# Patient Record
Sex: FEMALE | Race: WHITE | NOT HISPANIC OR LATINO | URBAN - METROPOLITAN AREA
[De-identification: names, ages, dates, MRNs, and addresses within clinical notes are randomized per-mention and may not be internally consistent; named-entity substitution may affect disease eponyms.]

---
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
## 2022-12-02 ENCOUNTER — OFFICE VISIT (OUTPATIENT)
Dept: OBSTETRICS AND GYNECOLOGY | Age: 22
End: 2022-12-02

## 2022-12-02 VITALS
DIASTOLIC BLOOD PRESSURE: 76 MMHG | SYSTOLIC BLOOD PRESSURE: 118 MMHG | HEIGHT: 64 IN | WEIGHT: 168 LBS | BODY MASS INDEX: 28.68 KG/M2

## 2022-12-02 DIAGNOSIS — Z11.3 ROUTINE SCREENING FOR STI (SEXUALLY TRANSMITTED INFECTION): ICD-10-CM

## 2022-12-02 DIAGNOSIS — O36.80X1 ENCOUNTER TO DETERMINE FETAL VIABILITY OF PREGNANCY, FETUS 1: ICD-10-CM

## 2022-12-02 DIAGNOSIS — Z3A.08 8 WEEKS GESTATION OF PREGNANCY: Primary | ICD-10-CM

## 2022-12-02 DIAGNOSIS — Z13.89 SCREENING FOR HEMATURIA OR PROTEINURIA: ICD-10-CM

## 2022-12-02 LAB
GLUCOSE UR STRIP-MCNC: NEGATIVE MG/DL
PROT UR STRIP-MCNC: NEGATIVE MG/DL

## 2022-12-02 PROCEDURE — 76817 TRANSVAGINAL US OBSTETRIC: CPT | Performed by: OBSTETRICS & GYNECOLOGY

## 2022-12-02 PROCEDURE — 99204 OFFICE O/P NEW MOD 45 MIN: CPT | Performed by: OBSTETRICS & GYNECOLOGY

## 2022-12-02 NOTE — PROGRESS NOTES
"GYN Visit    2022    Patient: Gela Valladares          MR#:[id number]      Chief Complaint   Patient presents with   • Follow-up     U/S @ 9:30, Pregnancy confirmation, LMP 10/3/22, No concerns at this time       History of Present Illness    22 y.o. female  who presents for  Pregnancy confirmation  Regular menses prior to preg  No nausea  Taking PNV  BF Aaron Billings  Lives with BF, not working or going to school currently  ?HPV series  Declines flu vac  Did not get covid vac            Patient's last menstrual period was 10/03/2022 (exact date).    ________________________________________  There is no problem list on file for this patient.      Past Medical History:   Diagnosis Date   • ADHD    • Anemia        History reviewed. No pertinent surgical history.    Social History     Tobacco Use   Smoking Status Never   Smokeless Tobacco Never       has a current medication list which includes the following prescription(s): ferrous sulfate and prenatal low iron.  ________________________________________    Current contraception: none      The following portions of the patient's history were reviewed and updated as appropriate: allergies, current medications, past family history, past medical history, past social history, past surgical history and problem list.    Review of Systems    Pertinent items are noted in HPI.     Objective   Physical Exam    /76   Ht 162.6 cm (64\")   Wt 76.2 kg (168 lb)   LMP 10/03/2022 (Exact Date)   Breastfeeding No   BMI 28.84 kg/m²    BP Readings from Last 3 Encounters:   22 118/76   22 120/77   22 124/81      Wt Readings from Last 3 Encounters:   22 76.2 kg (168 lb)   22 77.6 kg (171 lb) (92 %, Z= 1.40)*   22 79.8 kg (176 lb) (94 %, Z= 1.51)*     * Growth percentiles are based on CDC (Girls, 2-20 Years) data.      BMI: Estimated body mass index is 28.84 kg/m² as calculated from the following:    Height as of this encounter: 162.6 cm " "(64\").    Weight as of this encounter: 76.2 kg (168 lb).    Lungs: non labored breathing, no wheezing or tachpnea  Extremities: extremities normal, atraumatic, no cyanosis or edema  Skin: Skin color, texture, turgor normal. No rashes or lesions  Neurologic: Grossly normal  General:   alert, appears stated age and cooperative   Abdomen: soft, non-tender, without masses or organomegaly            See US- viable IUP at 8 weeks                 Assessment:      Diagnoses and all orders for this visit:    1. 8 weeks gestation of pregnancy (Primary)  -     OB Panel With HIV  -     Urine Culture - Urine, Urine, Clean Catch  -     Chlamydia / Gonococcus / Mycoplasma Genitalium, AKILAH, Urine - Urine, Clean Catch  -     POC Urinalysis Dipstick    2. Encounter to determine fetal viability of pregnancy, fetus 1  -     US Ob Transvaginal    3. Screening for hematuria or proteinuria  -     Urine Culture - Urine, Urine, Clean Catch  -     POC Urinalysis Dipstick    4. Routine screening for STI (sexually transmitted infection)  -     Chlamydia / Gonococcus / Mycoplasma Genitalium, AKILAH, Urine - Urine, Clean Catch              "

## 2022-12-03 LAB
ABO GROUP BLD: ABNORMAL
BASOPHILS # BLD AUTO: 0 X10E3/UL (ref 0–0.2)
BASOPHILS NFR BLD AUTO: 0 %
BLD GP AB SCN SERPL QL: NEGATIVE
EOSINOPHIL # BLD AUTO: 0.1 X10E3/UL (ref 0–0.4)
EOSINOPHIL NFR BLD AUTO: 1 %
ERYTHROCYTE [DISTWIDTH] IN BLOOD BY AUTOMATED COUNT: 12.9 % (ref 11.7–15.4)
HBV SURFACE AG SERPL QL IA: NEGATIVE
HCT VFR BLD AUTO: 40 % (ref 34–46.6)
HCV AB S/CO SERPL IA: <0.1 S/CO RATIO (ref 0–0.9)
HGB BLD-MCNC: 13.4 G/DL (ref 11.1–15.9)
HIV 1+2 AB+HIV1 P24 AG SERPL QL IA: NON REACTIVE
IMM GRANULOCYTES # BLD AUTO: 0 X10E3/UL (ref 0–0.1)
IMM GRANULOCYTES NFR BLD AUTO: 0 %
LYMPHOCYTES # BLD AUTO: 2 X10E3/UL (ref 0.7–3.1)
LYMPHOCYTES NFR BLD AUTO: 29 %
MCH RBC QN AUTO: 28.6 PG (ref 26.6–33)
MCHC RBC AUTO-ENTMCNC: 33.5 G/DL (ref 31.5–35.7)
MCV RBC AUTO: 86 FL (ref 79–97)
MONOCYTES # BLD AUTO: 0.4 X10E3/UL (ref 0.1–0.9)
MONOCYTES NFR BLD AUTO: 6 %
NEUTROPHILS # BLD AUTO: 4.5 X10E3/UL (ref 1.4–7)
NEUTROPHILS NFR BLD AUTO: 64 %
PLATELET # BLD AUTO: 240 X10E3/UL (ref 150–450)
RBC # BLD AUTO: 4.68 X10E6/UL (ref 3.77–5.28)
RH BLD: POSITIVE
RPR SER QL: NON REACTIVE
RUBV IGG SERPL IA-ACNC: <0.9 INDEX
WBC # BLD AUTO: 6.9 X10E3/UL (ref 3.4–10.8)

## 2022-12-08 LAB
BACTERIA UR CULT: NORMAL
BACTERIA UR CULT: NORMAL
C TRACH RRNA UR QL NAA+PROBE: NEGATIVE
M GENITALIUM DNA UR QL NAA+PROBE: NEGATIVE
N GONORRHOEA RRNA UR QL NAA+PROBE: NEGATIVE

## 2022-12-28 ENCOUNTER — TELEPHONE (OUTPATIENT)
Dept: OBSTETRICS AND GYNECOLOGY | Age: 22
End: 2022-12-28

## 2022-12-28 RX ORDER — PNV NO.95/FERROUS FUM/FOLIC AC 28MG-0.8MG
1 TABLET ORAL DAILY
Qty: 30 TABLET | Refills: 6 | Status: SHIPPED | OUTPATIENT
Start: 2022-12-28 | End: 2023-01-31 | Stop reason: SDUPTHER

## 2022-12-28 NOTE — TELEPHONE ENCOUNTER
Caller: Gela Valladares    Relationship: Self    Best call back number: [phone]    Requested Prescriptions:  PRENATALS     Pharmacy where request should be sent:  [phone]    Additional details provided by patient: PT ASKED IF DR. MONGE COULD PRESCRIBE HER PRENATALS AND SEND TO [facility].     Would you like a call back once the refill request has been completed: [x] Yes [] No    If the office needs to give you a call back, can they leave a voicemail: [x] Yes [] No    Lisa Fernandes Rep   12/28/22 08:21 EST

## 2023-01-03 ENCOUNTER — ROUTINE PRENATAL (OUTPATIENT)
Dept: OBSTETRICS AND GYNECOLOGY | Age: 23
End: 2023-01-03
Payer: COMMERCIAL

## 2023-01-03 VITALS — BODY MASS INDEX: 28.31 KG/M2 | SYSTOLIC BLOOD PRESSURE: 118 MMHG | WEIGHT: 164.9 LBS | DIASTOLIC BLOOD PRESSURE: 74 MMHG

## 2023-01-03 DIAGNOSIS — Z3A.13 13 WEEKS GESTATION OF PREGNANCY: Primary | ICD-10-CM

## 2023-01-03 DIAGNOSIS — Z34.81 PRENATAL CARE, SUBSEQUENT PREGNANCY IN FIRST TRIMESTER: ICD-10-CM

## 2023-01-03 DIAGNOSIS — Z13.89 SCREENING FOR HEMATURIA OR PROTEINURIA: ICD-10-CM

## 2023-01-03 DIAGNOSIS — Z31.430 ENCOUNTER OF FEMALE FOR TESTING FOR GENETIC DISEASE CARRIER STATUS FOR PROCREATIVE MANAGEMENT: ICD-10-CM

## 2023-01-03 LAB
GLUCOSE UR STRIP-MCNC: NEGATIVE MG/DL
PROT UR STRIP-MCNC: NEGATIVE MG/DL

## 2023-01-03 PROCEDURE — 99213 OFFICE O/P EST LOW 20 MIN: CPT | Performed by: OBSTETRICS & GYNECOLOGY

## 2023-01-03 NOTE — PROGRESS NOTES
Pt feels well , no issues  Panorama and horizon today  Follow up 4 weeks, declines flu vac  AFP at fu   Chief Complaint   Patient presents with   • Routine Prenatal Visit     13w1d, Gene Testing today, Declined Flu shot, No concerns at this time       HPI:  Pt presents for routine prenatal visit    ROS:  No fever or chills, no nausea or vomiting, no contractions, no leg pain, no LE edema, no leaking fluid, no bleeding, no headache, no dysuria  All other systems reviewed and negative    Exam:  See flow sheet  General:  Alert and oriented and no distress  Neck: no lymphadenopathy or thyromegaly  Lungs: non - labored breathing  Abd:  See flow sheet, fundus nontender  Ext: see flow sheet, non-tender bilateral , no lesions  Neuro: grossly normal    Assessment:/ PLAN:    Diagnoses and all orders for this visit:    1. 13 weeks gestation of pregnancy (Primary)  -     POC Urinalysis Dipstick  -     Rosita Panorama Prenatal Test: Chromosomes 13, 18, 21, X & Y: Triploidy 22Q.11.2 Deletion - Blood,  -     Rosita Horizon 14 (Pan-Ethnic Standard) - Blood,    2. Screening for hematuria or proteinuria  -     POC Urinalysis Dipstick    3. Prenatal care, subsequent pregnancy in first trimester  -     Rosita Panorama Prenatal Test: Chromosomes 13, 18, 21, X & Y: Triploidy 22Q.11.2 Deletion - Blood,  -     Rosita Horizon 14 (Pan-Ethnic Standard) - Blood,    4. Encounter of female for testing for genetic disease carrier status for procreative management  -     Rosita Panorama Prenatal Test: Chromosomes 13, 18, 21, X & Y: Triploidy 22Q.11.2 Deletion - Blood,  -     Rosita Horizon 14 (Pan-Ethnic Standard) - Blood,

## 2023-01-09 LAB
Lab: NORMAL
NTRA FETAL FRACTION: NORMAL
NTRA GENDER OF FETUS: NORMAL
NTRA MONOSOMY X AGE-BASED RISK TEXT: NORMAL
NTRA MONOSOMY X RESULT TEXT: NORMAL
NTRA MONOSOMY X RISK SCORE TEXT: NORMAL
NTRA TRIPLOIDY RESULT TEXT: NORMAL
NTRA TRISOMY 13 AGE-BASED RISK TEXT: NORMAL
NTRA TRISOMY 13 RESULT TEXT: NORMAL
NTRA TRISOMY 13 RISK SCORE TEXT: NORMAL
NTRA TRISOMY 18 AGE-BASED RISK TEXT: NORMAL
NTRA TRISOMY 18 RESULT TEXT: NORMAL
NTRA TRISOMY 18 RISK SCORE TEXT: NORMAL
NTRA TRISOMY 21 AGE-BASED RISK TEXT: NORMAL
NTRA TRISOMY 21 RESULT TEXT: NORMAL
NTRA TRISOMY 21 RISK SCORE TEXT: NORMAL

## 2023-01-10 LAB
Lab: NEGATIVE
Lab: NORMAL
NTRA ALPHA-THALASSEMIA: NEGATIVE
NTRA BETA-HEMOGLOBINOPATHIES: NEGATIVE
NTRA CANAVAN DISEASE: NEGATIVE
NTRA CYSTIC FIBROSIS: NEGATIVE
NTRA DUCHENNE/BECKER MUSCULAR DYSTROPHY: NEGATIVE
NTRA FAMILIAL DYSAUTONOMIA: NEGATIVE
NTRA FRAGILE X SYNDROME: NEGATIVE
NTRA GALACTOSEMIA: NEGATIVE
NTRA GAUCHER DISEASE: NEGATIVE
NTRA MEDIUM CHAIN ACYL-COA DEHYDROGENASE DEFICIENCY: NEGATIVE
NTRA POLYCYSTIC KIDNEY DISEASE, AUTOSOMAL RECESSIVE: NEGATIVE
NTRA SMITH-LEMLI-OPITZ SYNDROME: NEGATIVE
NTRA SPINAL MUSCULAR ATROPHY: NEGATIVE
NTRA TAY-SACHS DISEASE: NEGATIVE

## 2023-01-31 RX ORDER — PNV NO.95/FERROUS FUM/FOLIC AC 28MG-0.8MG
1 TABLET ORAL DAILY
Qty: 30 TABLET | Refills: 6 | Status: SHIPPED | OUTPATIENT
Start: 2023-01-31

## 2023-01-31 NOTE — TELEPHONE ENCOUNTER
Caller: Gela Valladares JERICHO    Relationship: Self    Best call back number: [phone]    Requested Prescriptions:   Requested Prescriptions     Pending Prescriptions Disp Refills   • Prenatal Vit-Fe Fumarate-FA (Prenatal Vitamin) 27-0.8 MG tablet 30 tablet 6     Sig: Take 1 tablet by mouth Daily.        Pharmacy where request should be sent: [id number]    Does the patient have less than a 3 day supply:  [x] Yes  [] No    Would you like a call back once the refill request has been completed: [x] Yes [] No    If the office needs to give you a call back, can they leave a voicemail: [x] Yes [] No    Lisa Fernandes Rep   01/31/23 13:54 EST

## 2023-02-02 ENCOUNTER — ROUTINE PRENATAL (OUTPATIENT)
Dept: OBSTETRICS AND GYNECOLOGY | Age: 23
End: 2023-02-02
Payer: COMMERCIAL

## 2023-02-02 VITALS — SYSTOLIC BLOOD PRESSURE: 116 MMHG | WEIGHT: 166 LBS | BODY MASS INDEX: 28.49 KG/M2 | DIASTOLIC BLOOD PRESSURE: 70 MMHG

## 2023-02-02 DIAGNOSIS — Z3A.17 17 WEEKS GESTATION OF PREGNANCY: ICD-10-CM

## 2023-02-02 DIAGNOSIS — Z34.80 SUPERVISION OF OTHER NORMAL PREGNANCY, ANTEPARTUM: ICD-10-CM

## 2023-02-02 DIAGNOSIS — Z13.89 SCREENING FOR BLOOD OR PROTEIN IN URINE: Primary | ICD-10-CM

## 2023-02-02 DIAGNOSIS — Z13.79 ENCOUNTER FOR GENETIC SCREENING FOR BIRTH DEFECT: ICD-10-CM

## 2023-02-02 LAB
GLUCOSE UR STRIP-MCNC: NEGATIVE MG/DL
PROT UR STRIP-MCNC: NEGATIVE MG/DL

## 2023-02-02 PROCEDURE — 99213 OFFICE O/P EST LOW 20 MIN: CPT | Performed by: OBSTETRICS & GYNECOLOGY

## 2023-02-02 NOTE — PROGRESS NOTES
Pt feels well, no issues  Declines flu vac again  AFP today  Anatomy at fu in 4 weeks  Its a girl!  Chief Complaint   Patient presents with   • Routine Prenatal Visit     Ob ck        HPI:  Pt presents for routine prenatal visit    ROS:  No fever or chills, no nausea or vomiting, no contractions, no leg pain, no LE edema, no leaking fluid, no bleeding, no headache, no dysuria  All other systems reviewed and negative    Exam:  See flow sheet  General:  Alert and oriented and no distress  Neck: no lymphadenopathy or thyromegaly  Lungs: non - labored breathing  Abd:  See flow sheet, fundus nontender  Ext: see flow sheet, non-tender bilateral , no lesions  Neuro: grossly normal    Assessment:/ PLAN:    Diagnoses and all orders for this visit:    1. Screening for blood or protein in urine (Primary)  -     POC Urinalysis Dipstick    2. 17 weeks gestation of pregnancy    3. Encounter for genetic screening for birth defect  -     Alpha Fetoprotein, Maternal    4. Supervision of other normal pregnancy, antepartum

## 2023-02-04 LAB
AFP INTERP SERPL-IMP: NORMAL
AFP INTERP SERPL-IMP: NORMAL
AFP MOM SERPL: 1.27
AFP SERPL-MCNC: 51.3 NG/ML
AGE AT DELIVERY: 20.8 YR
GA METHOD: NORMAL
GA: 17.6 WEEKS
IDDM PATIENT QL: NO
LABORATORY COMMENT REPORT: NORMAL
MULTIPLE PREGNANCY: NO
NEURAL TUBE DEFECT RISK FETUS: 5251 %
RESULT: NORMAL

## 2023-03-03 ENCOUNTER — TELEPHONE (OUTPATIENT)
Dept: OBSTETRICS AND GYNECOLOGY | Facility: CLINIC | Age: 23
End: 2023-03-03
Payer: COMMERCIAL

## 2023-03-03 ENCOUNTER — TELEPHONE (OUTPATIENT)
Dept: OBSTETRICS AND GYNECOLOGY | Age: 23
End: 2023-03-03

## 2023-03-03 NOTE — TELEPHONE ENCOUNTER
Caller: Gela Valladares    Relationship to patient: Self    Best call back number: [phone]    Type of visit: U/S AND OB FOLLOW UP     If rescheduling, when is the original appointment: 3/3/23    Additional notes:PT NEEDS TO R/S TODAYS U/S AND OB FOLLOW UP APPT DUE TO WEATHER PLEASE CALL PT TO R/S.

## 2023-03-03 NOTE — TELEPHONE ENCOUNTER
Caller: Gela Valladares    Relationship: Self    Best call back number: [phone]    Who is your current provider: DR MONGE @ [facility]    Who would you like your new provider to be: DR KRUSE    What are your reasons for transferring care: PT CURRENTLY PREGNANT AND STATES [facility] [place] [place] CLOSER TO HER    PLEASE ADVISE -[phone]

## 2023-03-06 NOTE — TELEPHONE ENCOUNTER
Tried calling patient but had no answer and voicemail box is not set up. I will try to reach out to patient through [employer].

## 2023-03-07 ENCOUNTER — ROUTINE PRENATAL (OUTPATIENT)
Dept: OBSTETRICS AND GYNECOLOGY | Age: 23
End: 2023-03-07
Payer: COMMERCIAL

## 2023-03-07 VITALS — BODY MASS INDEX: 27.98 KG/M2 | DIASTOLIC BLOOD PRESSURE: 68 MMHG | WEIGHT: 163 LBS | SYSTOLIC BLOOD PRESSURE: 114 MMHG

## 2023-03-07 DIAGNOSIS — Z13.89 SCREENING FOR BLOOD OR PROTEIN IN URINE: ICD-10-CM

## 2023-03-07 DIAGNOSIS — Z34.02 PRENATAL CARE, FIRST PREGNANCY IN SECOND TRIMESTER: Primary | ICD-10-CM

## 2023-03-07 LAB
GLUCOSE UR STRIP-MCNC: NEGATIVE MG/DL
PROT UR STRIP-MCNC: ABNORMAL MG/DL

## 2023-03-07 PROCEDURE — 99213 OFFICE O/P EST LOW 20 MIN: CPT | Performed by: NURSE PRACTITIONER

## 2023-03-07 NOTE — PROGRESS NOTES
Chief Complaint   Patient presents with   • Routine Prenatal Visit     Cc;  no problems       HPI: 22 y.o.  at 22w1d     Doing well  Reports fetal movement  Denies LOF, bleeding or ctx's  No complaints today  Pt of Dr. Ra Gloria:    23 1030   BP: 114/68   Weight: 73.9 kg (163 lb)       ROS:  GI:  Negative  : Negative  Pulmonary: Negative     A/P  1. Intrauterine pregnancy at 22w1d   2. Pregnancy Risk:  NORMAL    Diagnoses and all orders for this visit:    1. Prenatal care, first pregnancy in second trimester (Primary)    2. Screening for blood or protein in urine  -     POC Urinalysis Dipstick        -----------------------  PLAN:   Normal completed anatomy  Pt is transferring care to [place] - closer to home  Has appt in 2 days    DOLLY Higginbotham  3/7/2023 10:34 EST

## 2023-03-09 ENCOUNTER — INITIAL PRENATAL (OUTPATIENT)
Dept: OBSTETRICS AND GYNECOLOGY | Facility: CLINIC | Age: 23
End: 2023-03-09
Payer: COMMERCIAL

## 2023-03-09 VITALS — SYSTOLIC BLOOD PRESSURE: 116 MMHG | BODY MASS INDEX: 28.49 KG/M2 | WEIGHT: 166 LBS | DIASTOLIC BLOOD PRESSURE: 86 MMHG

## 2023-03-09 DIAGNOSIS — Z34.92 PRENATAL CARE IN SECOND TRIMESTER: Primary | ICD-10-CM

## 2023-03-09 DIAGNOSIS — Z36.9 ENCOUNTER FOR ANTENATAL SCREENING, UNSPECIFIED: ICD-10-CM

## 2023-03-09 LAB
EXTERNAL CYSTIC FIBROSIS: NORMAL
EXTERNAL NIPT: NORMAL
GLUCOSE UR STRIP-MCNC: NEGATIVE MG/DL
PROT UR STRIP-MCNC: NEGATIVE MG/DL

## 2023-03-09 PROCEDURE — 99213 OFFICE O/P EST LOW 20 MIN: CPT | Performed by: NURSE PRACTITIONER

## 2023-03-09 NOTE — PROGRESS NOTES
OB follow up > 20 weeks    Chief Complaint   Patient presents with   • Initial Prenatal Visit     Transfer Of Care        Gela Valladares is a 22 y.o.  22w3d being seen today for her obstetrical visit.  Patient reports no complaints. Taking prenatal vitamins: No. She is a transfer of care. She is looking for a doctor closer to home. She has had genetic carrier screening and cfDNA testing- both negative.       Review of Systems  Genitourinary: Negative for contractions, cramping, vaginal bleeding, or SROM.   Fetal movement: normal  No Known Allergies     /86   Wt 75.3 kg (166 lb)   LMP 10/03/2022 (Exact Date)   BMI 28.49 kg/m²     FHT: 140s BPM   Uterine Size: size equals dates       Assessment    1) pregnancy at 22w3d     2) Flu vaccine- declines    3) Covid vaccine- declines.     4) Rubella non immune- Offer MMR postpartum.     5) Needs UDS and varicella. Check UDS today. Can do varicella with 2hr GTT.       Plan    Continue prenatal vitamins  Reviewed this stage of pregnancy  Problem list updated   Follow up in 4 weeks    DOLLY Andesron  3/9/2023  16:12 EST

## 2023-03-10 LAB
AMPHETAMINES UR QL SCN: NEGATIVE NG/ML
BARBITURATES UR QL SCN: NEGATIVE NG/ML
BENZODIAZ UR QL SCN: NEGATIVE NG/ML
BZE UR QL SCN: NEGATIVE NG/ML
CANNABINOIDS UR QL SCN: NEGATIVE NG/ML
CREAT UR-MCNC: 118 MG/DL (ref 20–300)
LABORATORY COMMENT REPORT: NORMAL
METHADONE UR QL SCN: NEGATIVE NG/ML
OPIATES UR QL SCN: NEGATIVE NG/ML
OXYCODONE+OXYMORPHONE UR QL SCN: NEGATIVE NG/ML
PCP UR QL: NEGATIVE NG/ML
PH UR: 7.5 [PH] (ref 4.5–8.9)
PROPOXYPH UR QL SCN: NEGATIVE NG/ML

## 2023-04-05 ENCOUNTER — TELEPHONE (OUTPATIENT)
Dept: OBSTETRICS AND GYNECOLOGY | Facility: CLINIC | Age: 23
End: 2023-04-05

## 2023-04-05 RX ORDER — DIPHENHYDRAMINE HYDROCHLORIDE 25 MG/1
25 CAPSULE ORAL NIGHTLY
Qty: 60 TABLET | Refills: 1 | Status: SHIPPED | OUTPATIENT
Start: 2023-04-05

## 2023-04-05 NOTE — TELEPHONE ENCOUNTER
Provider: DR. KRUSE  Caller: RINA FUCHS  Relationship to Patient: SELF  Pharmacy:   [facility] [id number] - [place] - [address] - [phone] [facility] [phone] FX Phone:  [phone]   Fax:  [phone]          Phone Number: [phone]    Reason for Call: PT CANCELLED FOR TODAY DUE TO NAUSEA. SHE WANTS TO KNOW IF DR. KRUSE CAN CALL IN SOME VITAMIN B6 TO HER RX.  WE R/S FOR NEXT FRI 4-14 PER OFFICE IS OK.     When was the patient last seen: 3-9

## 2023-04-11 ENCOUNTER — REFERRAL TRIAGE (OUTPATIENT)
Dept: LABOR AND DELIVERY | Facility: HOSPITAL | Age: 23
End: 2023-04-11
Payer: COMMERCIAL

## 2023-04-13 ENCOUNTER — PATIENT OUTREACH (OUTPATIENT)
Dept: LABOR AND DELIVERY | Facility: HOSPITAL | Age: 23
End: 2023-04-13
Payer: COMMERCIAL

## 2023-04-13 NOTE — OUTREACH NOTE
[facility]  Unable to Reach       Questions/Answers    Flowsheet Row Responses   Pending Outreach Confirm Patient Interest   Call Attempt First   Outcome Not available   Next Call Attempt Date 04/17/23          Unable to leave vm, unavailable.     Shukri Osei RN  Maternity Nurse Navigator    4/13/2023, 17:13 EDT

## 2023-04-14 ENCOUNTER — ROUTINE PRENATAL (OUTPATIENT)
Dept: OBSTETRICS AND GYNECOLOGY | Facility: CLINIC | Age: 23
End: 2023-04-14
Payer: COMMERCIAL

## 2023-04-14 VITALS — BODY MASS INDEX: 29.97 KG/M2 | WEIGHT: 174.6 LBS | SYSTOLIC BLOOD PRESSURE: 112 MMHG | DIASTOLIC BLOOD PRESSURE: 62 MMHG

## 2023-04-14 DIAGNOSIS — Z34.92 PRENATAL CARE IN SECOND TRIMESTER: Primary | ICD-10-CM

## 2023-04-14 LAB
GLUCOSE UR STRIP-MCNC: NEGATIVE MG/DL
PROT UR STRIP-MCNC: NEGATIVE MG/DL

## 2023-04-14 NOTE — PROGRESS NOTES
S:  cc: Pt here for ob office visit.  hpi: Gela Valladares is a 22 y.o.  27w4d being seen today for her obstetrical visit.   Patient reports no complaints .   Fetal movement: normal. .      Social History     Social History Narrative   • Not on file      SMOKER? No      O:  /62   Wt 79.2 kg (174 lb 9.6 oz)   LMP 10/03/2022 (Exact Date)   BMI 29.97 kg/m²     Prenatal Assessment  Fetal Heart Rate: present  Fundal Height (cm): 27 cm  Movement: Present  Prenatal Vitals  BP: 112/62  Weight: 79.2 kg (174 lb 9.6 oz)  Urine Glucose/Protein  Urine Glucose Read-only: Negative  Urine Protein Read-only: Negative    Brief Urine Lab Results  (Last result in the past 365 days)      Color   Clarity   Blood   Leuk Est   Nitrite   Protein   CREAT   Urine HCG        23 0000           Negative                 A:    DIAGNOSES:  22 y.o.  27w4d  There are no diagnoses linked to this encounter.  NEW PROBLEMS? No.  Pt declines Tdap today.  Needs GTT scheduled.    P:  Return in about 1 week (around 2023) for ob tummy, GTT/HH, Tdap.    Pt instructed to call for results of any testing done today and that failure to call if she has not heard from us could result in inadequate care and treament.  Pt verbalized her understanding.   Time Spent: I spent 30+ minutes caring for Gela on this date of service. This time includes time spent by me in the following activities: preparing for the visit, reviewing tests, obtaining and/or reviewing a separately obtained history, performing a medically appropriate examination and/or evaluation, counseling and educating the patient/family/caregiver, ordering medications, tests, or procedures, referring and communicating with other health care professionals, documenting information in the medical record, independently interpreting results and communicating that information with the patient/family/caregiver and care coordination.      Johny Vieyra MD  12:20 EDT   23

## 2023-04-17 ENCOUNTER — PATIENT OUTREACH (OUTPATIENT)
Dept: LABOR AND DELIVERY | Facility: HOSPITAL | Age: 23
End: 2023-04-17
Payer: COMMERCIAL

## 2023-04-17 NOTE — OUTREACH NOTE
[facility]  Unable to Reach       Questions/Answers    Flowsheet Row Responses   Pending Outreach Confirm Patient Interest   Call Attempt Second   Outcome Not available          Declined program for pt d/t UTR: 2 attempts at voicemails (unavail) and 1 mychart msg sent. Pt has my contact info and encouraged her to reach out with any needs.      Shukri Osei RN  Maternity Nurse Navigator    4/17/2023, 12:46 EDT

## 2023-04-21 ENCOUNTER — ROUTINE PRENATAL (OUTPATIENT)
Dept: OBSTETRICS AND GYNECOLOGY | Facility: CLINIC | Age: 23
End: 2023-04-21
Payer: COMMERCIAL

## 2023-04-21 VITALS — DIASTOLIC BLOOD PRESSURE: 70 MMHG | SYSTOLIC BLOOD PRESSURE: 110 MMHG | WEIGHT: 174 LBS | BODY MASS INDEX: 29.87 KG/M2

## 2023-04-21 DIAGNOSIS — Z34.93 PRENATAL CARE IN THIRD TRIMESTER: ICD-10-CM

## 2023-04-21 DIAGNOSIS — Z36.9 ENCOUNTER FOR ANTENATAL SCREENING, UNSPECIFIED: Primary | ICD-10-CM

## 2023-04-21 LAB
GLUCOSE 1H P 75 G GLC PO SERPL-MCNC: 148 MG/DL (ref 65–179)
GLUCOSE 2H P 75 G GLC PO SERPL-MCNC: 131 MG/DL (ref 65–154)
GLUCOSE P FAST SERPL-MCNC: 75 MG/DL (ref 65–94)
GLUCOSE UR STRIP-MCNC: NEGATIVE MG/DL
HCT VFR BLD AUTO: 36.7 % (ref 34–46.6)
HGB BLD-MCNC: 12 G/DL (ref 12–15.9)
PROT UR STRIP-MCNC: NEGATIVE MG/DL

## 2023-04-21 NOTE — PROGRESS NOTES
OB follow up > 20 weeks    Chief Complaint   Patient presents with   • Routine Prenatal Visit       Gela Valladares is a 22 y.o.  28w4d being seen today for her obstetrical visit.  Patient reports no complaints. Taking prenatal vitamins: Yes. She is doing her 2hr GTT today.        Review of Systems    Genitourinary: Negative for contractions, cramping, vaginal bleeding, or SROM.   Fetal movement: normal  No Known Allergies     /70   Wt 78.9 kg (174 lb)   LMP 10/03/2022 (Exact Date)   BMI 29.87 kg/m²     FHT: 130s BPM   Uterine Size: size equals dates       Assessment    1) pregnancy at 28w4d - 2hr GTT in progress. Rh +.     2) Flu vaccine- declines     3) Covid vaccine- declines.      4) Rubella non immune- Offer MMR postpartum.      5) Varicella- Checking Varicella IgG today with labs     6) tDap- Disc that all pregnant women should get a Tdap shot in the third trimester, preferably between 27 weeks and 36 weeks of pregnancy. The Tdap shot is an effective and safe way to protect the baby from serious illness and complications of pertussis. Recommend that partners, family members, and infant caregivers should be up to date on theTdap vaccine if they have not previously been vaccinated. Ideally, all family members should be vaccinated at least 2 weeks before coming in contact with the . If not administered during pregnancy, the Tdap vaccine should be given immediately postpartum if the patient is not UTD on Tdap.        Plan    Continue prenatal vitamins  Reviewed this stage of pregnancy  Problem list updated   Follow up in 2 weeks    DOLLY Anderson  2023  11:17 EDT

## 2023-04-22 LAB — VZV IGG SER IA-ACNC: 1591 INDEX

## 2023-05-08 ENCOUNTER — ROUTINE PRENATAL (OUTPATIENT)
Dept: OBSTETRICS AND GYNECOLOGY | Facility: CLINIC | Age: 23
End: 2023-05-08
Payer: COMMERCIAL

## 2023-05-08 VITALS — BODY MASS INDEX: 30.83 KG/M2 | WEIGHT: 179.6 LBS | SYSTOLIC BLOOD PRESSURE: 112 MMHG | DIASTOLIC BLOOD PRESSURE: 74 MMHG

## 2023-05-08 DIAGNOSIS — Z34.93 PRENATAL CARE IN THIRD TRIMESTER: Primary | ICD-10-CM

## 2023-05-08 PROBLEM — Z28.39 RUBELLA NON-IMMUNE STATUS, ANTEPARTUM: Status: ACTIVE | Noted: 2023-05-08

## 2023-05-08 PROBLEM — O09.899 RUBELLA NON-IMMUNE STATUS, ANTEPARTUM: Status: ACTIVE | Noted: 2023-05-08

## 2023-05-08 LAB
GLUCOSE UR STRIP-MCNC: NEGATIVE MG/DL
PROT UR STRIP-MCNC: NEGATIVE MG/DL

## 2023-05-08 NOTE — PROGRESS NOTES
OB follow up > 20 weeks    Chief Complaint   Patient presents with   • Routine Prenatal Visit       Gela Valladares is a 22 y.o.  31w0d being seen today for her obstetrical visit.  Patient reports no complaints. Taking prenatal vitamins: Yes. FM+.         Review of Systems    Genitourinary: Negative for contractions, cramping, vaginal bleeding, or SROM.   Fetal movement: normal  No Known Allergies     /74   Wt 81.5 kg (179 lb 9.6 oz)   LMP 10/03/2022 (Exact Date)   BMI 30.83 kg/m²     FHT: 130s BPM   Uterine Size: size equals dates       Assessment    1) pregnancy at 31w0d- Rh +.     2) Flu vaccine- declines     3) Covid vaccine- declines.      4) Rubella non immune- Offer MMR postpartum.      5) TDap- Disc that all pregnant women should get a Tdap shot in the third trimester, preferably between 27 weeks and 36 weeks of pregnancy. The Tdap shot is an effective and safe way to protect the baby from serious illness and complications of pertussis. Recommend that partners, family members, and infant caregivers should be up to date on theTdap vaccine if they have not previously been vaccinated. Ideally, all family members should be vaccinated at least 2 weeks before coming in contact with the . If not administered during pregnancy, the Tdap vaccine should be given immediately postpartum if the patient is not UTD on Tdap.        Plan    Continue prenatal vitamins  Reviewed this stage of pregnancy  Problem list updated   Follow up in 2 weeks for OBT    Dorothy Davis, DOLLY  2023  15:46 EDT

## 2023-05-23 ENCOUNTER — ROUTINE PRENATAL (OUTPATIENT)
Dept: OBSTETRICS AND GYNECOLOGY | Facility: CLINIC | Age: 23
End: 2023-05-23
Payer: COMMERCIAL

## 2023-05-23 VITALS — DIASTOLIC BLOOD PRESSURE: 70 MMHG | BODY MASS INDEX: 30.55 KG/M2 | WEIGHT: 178 LBS | SYSTOLIC BLOOD PRESSURE: 114 MMHG

## 2023-05-23 DIAGNOSIS — O09.899 RUBELLA NON-IMMUNE STATUS, ANTEPARTUM: ICD-10-CM

## 2023-05-23 DIAGNOSIS — Z34.93 PRENATAL CARE IN THIRD TRIMESTER: Primary | ICD-10-CM

## 2023-05-23 DIAGNOSIS — Z28.39 RUBELLA NON-IMMUNE STATUS, ANTEPARTUM: ICD-10-CM

## 2023-05-23 LAB
GLUCOSE UR STRIP-MCNC: NEGATIVE MG/DL
PROT UR STRIP-MCNC: NEGATIVE MG/DL

## 2023-05-23 NOTE — PROGRESS NOTES
OB follow up > 20 weeks     Chief Complaint   Patient presents with   • Routine Prenatal Visit       Gela Valladares is a 22 y.o.  33w1d being seen today for her obstetrical visit.  Patient reports no complaints. Taking prenatal vitamins: Yes. Reports feeling good fetal movement.  She is accompanied by her partner today.          Review of Systems    Genitourinary: Negative for contractions, cramping, vaginal bleeding, or SROM.   Fetal movement: normal  No Known Allergies     /70   Wt 80.7 kg (178 lb)   LMP 10/03/2022 (Exact Date)   BMI 30.55 kg/m²     FHT: 130s  BPM   Uterine Size: size equals dates       Assessment    1) pregnancy at 33w1d-     2) Flu vaccine- declines     3) Covid vaccine- declines.      4) Rubella non immune- Offer MMR postpartum.      5) tDap- Has previously been educated on. Pt undecided on vaccine.     6) Peds- [facility]. Female. Bottle. Epidural.       Plan    Continue prenatal vitamins  Reviewed this stage of pregnancy  Problem list updated   Follow up in 2 weeks for OBT    Lilly Deluca, DOLLY  2023  11:36 EDT

## 2023-06-03 ENCOUNTER — HOSPITAL ENCOUNTER (OUTPATIENT)
Facility: HOSPITAL | Age: 23
Discharge: HOME | End: 2023-06-03
Attending: STUDENT IN AN ORGANIZED HEALTH CARE EDUCATION/TRAINING PROGRAM | Admitting: STUDENT IN AN ORGANIZED HEALTH CARE EDUCATION/TRAINING PROGRAM
Payer: COMMERCIAL

## 2023-06-03 ENCOUNTER — HOSPITAL ENCOUNTER (EMERGENCY)
Facility: HOSPITAL | Age: 23
Discharge: HOME | End: 2023-06-03
Attending: EMERGENCY MEDICINE
Payer: COMMERCIAL

## 2023-06-03 VITALS
TEMPERATURE: 98.5 F | BODY MASS INDEX: 29.99 KG/M2 | RESPIRATION RATE: 15 BRPM | SYSTOLIC BLOOD PRESSURE: 111 MMHG | HEART RATE: 105 BPM | DIASTOLIC BLOOD PRESSURE: 69 MMHG | HEIGHT: 65 IN | OXYGEN SATURATION: 98 % | WEIGHT: 180 LBS

## 2023-06-03 VITALS
RESPIRATION RATE: 18 BRPM | BODY MASS INDEX: 29.99 KG/M2 | SYSTOLIC BLOOD PRESSURE: 107 MMHG | HEIGHT: 65 IN | TEMPERATURE: 98.5 F | HEART RATE: 96 BPM | DIASTOLIC BLOOD PRESSURE: 57 MMHG | WEIGHT: 180 LBS

## 2023-06-03 DIAGNOSIS — S40.021A CONTUSION OF RIGHT UPPER ARM, INITIAL ENCOUNTER: ICD-10-CM
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
## 2023-06-03 LAB
AMPHET+METHAMPHET UR QL: NEGATIVE
AMPHETAMINES UR QL: NEGATIVE
BARBITURATES UR QL SCN: NEGATIVE
BENZODIAZ UR QL SCN: NEGATIVE
BILIRUB UR QL STRIP: NEGATIVE
BUPRENORPHINE SERPL-MCNC: NEGATIVE NG/ML
CANNABINOIDS SERPL QL: NEGATIVE
CLARITY UR: CLEAR
COCAINE UR QL: NEGATIVE
COLOR UR: NORMAL
GLUCOSE UR STRIP-MCNC: NEGATIVE MG/DL
HGB UR QL STRIP.AUTO: NEGATIVE
KETONES UR QL STRIP: NEGATIVE
LEUKOCYTE ESTERASE UR QL STRIP.AUTO: NEGATIVE
METHADONE UR QL SCN: NEGATIVE
NITRITE UR QL STRIP: NEGATIVE
OPIATES UR QL: NEGATIVE
OXYCODONE UR QL SCN: NEGATIVE
PCP UR QL SCN: NEGATIVE
PH UR STRIP.AUTO: 6.5 [PH] (ref 4.5–8)
PROPOXYPH UR QL: NEGATIVE
PROT UR QL STRIP: NEGATIVE
SP GR UR STRIP: 1.01 (ref 1–1.03)
TRICYCLICS UR QL SCN: NEGATIVE
UROBILINOGEN UR QL STRIP: NORMAL

## 2023-06-03 PROCEDURE — 59025 FETAL NON-STRESS TEST: CPT

## 2023-06-03 PROCEDURE — 59025 FETAL NON-STRESS TEST: CPT | Performed by: STUDENT IN AN ORGANIZED HEALTH CARE EDUCATION/TRAINING PROGRAM

## 2023-06-03 PROCEDURE — 81003 URINALYSIS AUTO W/O SCOPE: CPT | Performed by: STUDENT IN AN ORGANIZED HEALTH CARE EDUCATION/TRAINING PROGRAM

## 2023-06-03 PROCEDURE — 80306 DRUG TEST PRSMV INSTRMNT: CPT | Performed by: STUDENT IN AN ORGANIZED HEALTH CARE EDUCATION/TRAINING PROGRAM

## 2023-06-03 PROCEDURE — 99282 EMERGENCY DEPT VISIT SF MDM: CPT

## 2023-06-03 PROCEDURE — G0463 HOSPITAL OUTPT CLINIC VISIT: HCPCS
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
## 2023-06-06 ENCOUNTER — ROUTINE PRENATAL (OUTPATIENT)
Dept: OBSTETRICS AND GYNECOLOGY | Facility: CLINIC | Age: 23
End: 2023-06-06
Payer: COMMERCIAL

## 2023-06-06 VITALS — WEIGHT: 180.8 LBS | DIASTOLIC BLOOD PRESSURE: 78 MMHG | BODY MASS INDEX: 30.09 KG/M2 | SYSTOLIC BLOOD PRESSURE: 140 MMHG

## 2023-06-06 DIAGNOSIS — Z28.39 RUBELLA NON-IMMUNE STATUS, ANTEPARTUM: ICD-10-CM

## 2023-06-06 DIAGNOSIS — Z36.9 ENCOUNTER FOR ANTENATAL SCREENING, UNSPECIFIED: ICD-10-CM

## 2023-06-06 DIAGNOSIS — O09.899 RUBELLA NON-IMMUNE STATUS, ANTEPARTUM: ICD-10-CM

## 2023-06-06 DIAGNOSIS — Z34.93 PRENATAL CARE IN THIRD TRIMESTER: Primary | ICD-10-CM

## 2023-06-06 LAB
BILIRUB BLD-MCNC: NEGATIVE MG/DL
CLARITY, POC: CLEAR
COLOR UR: YELLOW
GLUCOSE UR STRIP-MCNC: NEGATIVE MG/DL
KETONES UR QL: NEGATIVE
LEUKOCYTE EST, POC: NEGATIVE
NITRITE UR-MCNC: NEGATIVE MG/ML
PH UR: 5 [PH] (ref 5–8)
PROT UR STRIP-MCNC: NEGATIVE MG/DL
RBC # UR STRIP: NEGATIVE /UL
SP GR UR: 1 (ref 1–1.03)
UROBILINOGEN UR QL: NORMAL

## 2023-06-06 NOTE — PROGRESS NOTES
OB follow up     Gela Valladares is a 22 y.o.  35w1d being seen today for her obstetrical visit.  Patient reports no bleeding, no contractions, and no leaking. Fetal movement: normal    Review of Systems  No bleeding, No cramping/contractions     /78   Wt 82 kg (180 lb 12.8 oz)   LMP 10/03/2022 (Exact Date)   BMI 30.09 kg/m²     FHT: present BPM   Uterine Size: 36 cm       Assessment/Plan:    1) 22 y.o.  -pregnancy at 35w1d    2)   Encounter Diagnoses   Name Primary?    Prenatal care in third trimester Yes    Rubella non-immune status, antepartum     Encounter for  screening, unspecified        3) Reviewed this stage of pregnancy  4) Problem list updated     Return in about 1 week (around 2023) for OB INT, GBS.    I spent 10 minutes caring for Gela on this date of service. This time includes time spent by me in the following activities: preparing for the visit, reviewing tests, obtaining and/or reviewing a separately obtained history, performing a medically appropriate examination and/or evaluation, counseling and educating the patient/family/caregiver, and documenting information in the medical record      Ed Romano MD    2023  16:48 EDT

## 2023-06-14 ENCOUNTER — ROUTINE PRENATAL (OUTPATIENT)
Dept: OBSTETRICS AND GYNECOLOGY | Facility: CLINIC | Age: 23
End: 2023-06-14
Payer: COMMERCIAL

## 2023-06-14 VITALS — BODY MASS INDEX: 30.75 KG/M2 | DIASTOLIC BLOOD PRESSURE: 82 MMHG | SYSTOLIC BLOOD PRESSURE: 124 MMHG | WEIGHT: 184.8 LBS

## 2023-06-14 DIAGNOSIS — Z36.85 ENCOUNTER FOR ANTENATAL SCREENING FOR STREPTOCOCCUS B: ICD-10-CM

## 2023-06-14 DIAGNOSIS — Z34.93 NORMAL PREGNANCY IN THIRD TRIMESTER: Primary | ICD-10-CM

## 2023-06-14 DIAGNOSIS — Z36.9 ENCOUNTER FOR ANTENATAL SCREENING, UNSPECIFIED: ICD-10-CM

## 2023-06-14 NOTE — PROGRESS NOTES
OB follow up     Gela Valladares is a 22 y.o.  36w2d being seen today for her obstetrical visit.  Patient reports no bleeding, no contractions and no leaking. Fetal movement: normal. Here for GBS.     Review of Systems  No bleeding, No cramping/contractions     /82   Wt 83.8 kg (184 lb 12.8 oz)   LMP 10/03/2022 (Exact Date)   BMI 30.75 kg/m²     FHT: present BPM   Uterine Size: 36 cm       Assessment/Plan:    1) 22 y.o.  -pregnancy at 36w2d    2)   Encounter Diagnoses   Name Primary?   • Normal pregnancy in third trimester Yes   • Encounter for  screening, unspecified    • Encounter for  screening for Streptococcus B    GBS collected    3) Reviewed this stage of pregnancy  4) Problem list updated     Return in about 1 week (around 2023) for OB INT.    I spent 20 minutes caring for Gela on this date of service. This time includes time spent by me in the following activities: preparing for the visit, reviewing tests, obtaining and/or reviewing a separately obtained history, performing a medically appropriate examination and/or evaluation, counseling and educating the patient/family/caregiver, ordering medications, tests, or procedures and documenting information in the medical record      Ed Romano MD    2023  16:37 EDT

## 2023-06-16 LAB — GP B STREP DNA SPEC QL NAA+PROBE: NEGATIVE

## 2023-06-30 ENCOUNTER — ROUTINE PRENATAL (OUTPATIENT)
Dept: OBSTETRICS AND GYNECOLOGY | Facility: CLINIC | Age: 23
End: 2023-06-30
Payer: COMMERCIAL

## 2023-06-30 VITALS — BODY MASS INDEX: 31.09 KG/M2 | DIASTOLIC BLOOD PRESSURE: 82 MMHG | WEIGHT: 186.8 LBS | SYSTOLIC BLOOD PRESSURE: 126 MMHG

## 2023-06-30 DIAGNOSIS — Z34.93 PRENATAL CARE IN THIRD TRIMESTER: ICD-10-CM

## 2023-06-30 DIAGNOSIS — Z36.9 ENCOUNTER FOR ANTENATAL SCREENING, UNSPECIFIED: Primary | ICD-10-CM

## 2023-06-30 PROCEDURE — 99214 OFFICE O/P EST MOD 30 MIN: CPT | Performed by: OBSTETRICS & GYNECOLOGY

## 2023-07-11 ENCOUNTER — HOSPITAL ENCOUNTER (INPATIENT)
Facility: HOSPITAL | Age: 23
LOS: 3 days | Discharge: HOME | End: 2023-07-14
Attending: STUDENT IN AN ORGANIZED HEALTH CARE EDUCATION/TRAINING PROGRAM | Admitting: STUDENT IN AN ORGANIZED HEALTH CARE EDUCATION/TRAINING PROGRAM
Payer: COMMERCIAL

## 2023-07-11 PROBLEM — Z34.90 PREGNANCY: Status: ACTIVE | Noted: 2023-07-11

## 2023-07-11 LAB
ABO GROUP BLD: NORMAL
ABO GROUP BLD: NORMAL
AMPHET+METHAMPHET UR QL: NEGATIVE
AMPHETAMINES UR QL: NEGATIVE
BARBITURATES UR QL SCN: NEGATIVE
BENZODIAZ UR QL SCN: NEGATIVE
BLD GP AB SCN SERPL QL: NEGATIVE
BUPRENORPHINE SERPL-MCNC: NEGATIVE NG/ML
CANNABINOIDS SERPL QL: NEGATIVE
COCAINE UR QL: NEGATIVE
DEPRECATED RDW RBC AUTO: 42.5 FL (ref 37–54)
ERYTHROCYTE [DISTWIDTH] IN BLOOD BY AUTOMATED COUNT: 13.1 % (ref 12.3–15.4)
HCT VFR BLD AUTO: 38.3 % (ref 34–46.6)
HGB BLD-MCNC: 12.6 G/DL (ref 12–15.9)
MCH RBC QN AUTO: 29.4 PG (ref 26.6–33)
MCHC RBC AUTO-ENTMCNC: 32.9 G/DL (ref 31.5–35.7)
MCV RBC AUTO: 89.5 FL (ref 79–97)
METHADONE UR QL SCN: NEGATIVE
OPIATES UR QL: NEGATIVE
OXYCODONE UR QL SCN: NEGATIVE
PCP UR QL SCN: NEGATIVE
PLATELET # BLD AUTO: 277 10*3/MM3 (ref 140–450)
PMV BLD AUTO: 11.3 FL (ref 6–12)
PROPOXYPH UR QL: NEGATIVE
RBC # BLD AUTO: 4.28 10*6/MM3 (ref 3.77–5.28)
RH BLD: POSITIVE
RH BLD: POSITIVE
T&S EXPIRATION DATE: NORMAL
TRICYCLICS UR QL SCN: NEGATIVE
WBC NRBC COR # BLD: 9.84 10*3/MM3 (ref 3.4–10.8)

## 2023-07-11 PROCEDURE — 59200 INSERT CERVICAL DILATOR: CPT | Performed by: STUDENT IN AN ORGANIZED HEALTH CARE EDUCATION/TRAINING PROGRAM

## 2023-07-11 PROCEDURE — 85027 COMPLETE CBC AUTOMATED: CPT | Performed by: STUDENT IN AN ORGANIZED HEALTH CARE EDUCATION/TRAINING PROGRAM

## 2023-07-11 PROCEDURE — S0260 H&P FOR SURGERY: HCPCS | Performed by: STUDENT IN AN ORGANIZED HEALTH CARE EDUCATION/TRAINING PROGRAM

## 2023-07-11 PROCEDURE — 86850 RBC ANTIBODY SCREEN: CPT | Performed by: STUDENT IN AN ORGANIZED HEALTH CARE EDUCATION/TRAINING PROGRAM

## 2023-07-11 PROCEDURE — 86901 BLOOD TYPING SEROLOGIC RH(D): CPT | Performed by: STUDENT IN AN ORGANIZED HEALTH CARE EDUCATION/TRAINING PROGRAM

## 2023-07-11 PROCEDURE — 80306 DRUG TEST PRSMV INSTRMNT: CPT | Performed by: STUDENT IN AN ORGANIZED HEALTH CARE EDUCATION/TRAINING PROGRAM

## 2023-07-11 PROCEDURE — 25010000002 DIPHENHYDRAMINE PER 50 MG: Performed by: STUDENT IN AN ORGANIZED HEALTH CARE EDUCATION/TRAINING PROGRAM

## 2023-07-11 PROCEDURE — 86901 BLOOD TYPING SEROLOGIC RH(D): CPT

## 2023-07-11 PROCEDURE — 86900 BLOOD TYPING SEROLOGIC ABO: CPT

## 2023-07-11 PROCEDURE — 86900 BLOOD TYPING SEROLOGIC ABO: CPT | Performed by: STUDENT IN AN ORGANIZED HEALTH CARE EDUCATION/TRAINING PROGRAM

## 2023-07-11 RX ORDER — MISOPROSTOL 100 MCG
25 TABLET ORAL ONCE
Status: DISCONTINUED | OUTPATIENT
Start: 2023-07-12 | End: 2023-07-11

## 2023-07-11 RX ORDER — MISOPROSTOL 100 MCG
50 TABLET ORAL ONCE
Status: COMPLETED | OUTPATIENT
Start: 2023-07-12 | End: 2023-07-12

## 2023-07-11 RX ORDER — LIDOCAINE HYDROCHLORIDE 10 MG/ML
5 INJECTION, SOLUTION EPIDURAL; INFILTRATION; INTRACAUDAL; PERINEURAL AS NEEDED
Status: DISCONTINUED | OUTPATIENT
Start: 2023-07-11 | End: 2023-07-14 | Stop reason: HOSPADM

## 2023-07-11 RX ORDER — MISOPROSTOL 100 MCG
25 TABLET ORAL
Status: COMPLETED | OUTPATIENT
Start: 2023-07-11 | End: 2023-07-12

## 2023-07-11 RX ORDER — SODIUM CHLORIDE 0.9 % (FLUSH) 0.9 %
10 SYRINGE (ML) INJECTION AS NEEDED
Status: DISCONTINUED | OUTPATIENT
Start: 2023-07-11 | End: 2023-07-14 | Stop reason: HOSPADM

## 2023-07-11 RX ORDER — SODIUM CHLORIDE, SODIUM LACTATE, POTASSIUM CHLORIDE, CALCIUM CHLORIDE 600; 310; 30; 20 MG/100ML; MG/100ML; MG/100ML; MG/100ML
125 INJECTION, SOLUTION INTRAVENOUS CONTINUOUS
Status: DISCONTINUED | OUTPATIENT
Start: 2023-07-11 | End: 2023-07-14 | Stop reason: HOSPADM

## 2023-07-11 RX ORDER — MAGNESIUM CARB/ALUMINUM HYDROX 105-160MG
30 TABLET,CHEWABLE ORAL ONCE
Status: DISCONTINUED | OUTPATIENT
Start: 2023-07-11 | End: 2023-07-14 | Stop reason: HOSPADM

## 2023-07-11 RX ORDER — FENTANYL CITRATE 50 UG/ML
100 INJECTION, SOLUTION INTRAMUSCULAR; INTRAVENOUS ONCE
Status: COMPLETED | OUTPATIENT
Start: 2023-07-11 | End: 2023-07-12

## 2023-07-11 RX ORDER — SODIUM CHLORIDE 0.9 % (FLUSH) 0.9 %
10 SYRINGE (ML) INJECTION EVERY 12 HOURS SCHEDULED
Status: DISCONTINUED | OUTPATIENT
Start: 2023-07-11 | End: 2023-07-14 | Stop reason: HOSPADM

## 2023-07-11 RX ORDER — DIPHENHYDRAMINE HYDROCHLORIDE 50 MG/ML
25 INJECTION INTRAMUSCULAR; INTRAVENOUS ONCE
Status: COMPLETED | OUTPATIENT
Start: 2023-07-12 | End: 2023-07-11

## 2023-07-11 RX ADMIN — Medication 25 MCG: at 19:45

## 2023-07-11 RX ADMIN — DIPHENHYDRAMINE HYDROCHLORIDE 25 MG: 50 INJECTION, SOLUTION INTRAMUSCULAR; INTRAVENOUS at 23:58

## 2023-07-11 NOTE — H&P
Patient Care Team:  Provider, No Known as PCP - General    Chief complaint IOL       HPI: 21yo G1 @ 40+1 wga presents for IOL . Denies VB, LOF or reg ctx. + FM     ROS:   Constitutional: Weight change and appetite change present.   Respiratory: Negative for cough and shortness of breath.    Cardiovascular: Negative for chest pain and palpitations.   Gastrointestinal: Negative    Endocrine: Negative.    Genitourinary: + missed menses, no dysuria   Skin: Negative.    Neurological: Negative for dizziness and headaches.   Hematological: Negative.    Psychiatric/Behavioral: Negative for dysphoric mood and sleep disturbance. The patient is not nervous/anxious.        PMH:   Past Medical History:   Diagnosis Date    ADHD     Anemia          PSH: No past surgical history on file.    SoHx:   Social History     Socioeconomic History    Marital status: Single   Tobacco Use    Smoking status: Never    Smokeless tobacco: Never   Vaping Use    Vaping Use: Never used   Substance and Sexual Activity    Alcohol use: Never    Drug use: Never    Sexual activity: Yes       FHx: No family history on file.    PGyn Hx: See office notes     POBHx: G1     Allergies: Patient has no known allergies.    Medications:   No current facility-administered medications on file prior to encounter.     Current Outpatient Medications on File Prior to Encounter   Medication Sig Dispense Refill    doxylamine (UNISOM) 25 MG tablet Take 1 tablet by mouth At Night As Needed for Sleep. 60 tablet 2    ferrous sulfate 325 (65 FE) MG tablet Take 1 tablet by mouth Daily With Breakfast.      Prenatal Vit-Fe Fumarate-FA (Prenatal Vitamin) 27-0.8 MG tablet Take 1 tablet by mouth Daily. 30 tablet 6    vitamin B-6 (PYRIDOXINE) 25 MG tablet Take 1 tablet by mouth Every Night. 60 tablet 1             Vital Signs       Physical Exam:  Constitutional: She is oriented to person, place, and time. She appears well-developed and well-nourished.   HENT:   Head:  How Severe Is It?: mild Is This A New Presentation, Or A Follow-Up?: Follow Up Isotretinoin Normocephalic and atraumatic.   Cardiovascular: Normal rate and regular rhythm.    Pulmonary/Chest: Effort normal. No respiratory distress.   Abdominal: Soft. Bowel sounds are normal. There is no tenderness. There is no rebound and no guarding.   Musculoskeletal: Normal range of motion.   Neurological: She is alert and oriented to person, place, and time.   Skin: Skin is warm and dry.   Psychiatric: She has a normal mood and affect. Her behavior is normal. Judgment and thought content normal.   Nursing note and vitals reviewed.  Debilities/Disabilities Identified: None  Emotional Behavior: Appropriate    Labs: Pending       Assessment/Plan:   21yo G1P at 40+1 presents for IOL.  ANC uncomplicated.  Maternal VSS WNL.   FWB demonstrated by reactive cat I NST.  SVE /-3.  Cephalic by TAUS.   Anticipate .    -Admit to L&D.  Consent signed and on chart.  -PIV, LR.  -T&S, CBC.  -Anesthesia consult.    -GBS Neg; prophylaxis not indicated.  -MBT A+; Rhophylac not indicated.  -Rubella NI; MMR  indicated.  -Induce via Cytotec/FB and Pitocin/amniotomy as indicated.  -CD for maternal/fetal indications.    Consent for Vaginal Delivery  Pt. counseled as to risk of labor and delivery including but not limited to infection, bleeding (with possible need for blood transfusion, and its inherent risks of virus transmission, i.e. HIV, Hepatitis; possible transfusion reaction), possible emergent  section with its risks of damage to internal organs and necessary repairs, possible operative vaginal delivery, NON routine use of episiotomy and routine use of internal monitors and associated risks, anesthetic complications, injury to infant or mother at time of delivery, maternal or fetal death.  The pt. understands these risks and is willing to proceed.  All of her questions were answered.    Induction of Labor  Risks have been outlined to include (but not limited to) infection, uterine tetany with uterine rupture and/or fetal  Display Cumulative Dose In The Note?: Yes distress, need for emergent  delivery and possible  hysterectomy, possible postpartum hemorrhage secondary to uterine atony which could be substantial enough to require blood transfusion with the inherent risks of hepatitis, AIDS, HIV infection as well as transfusion reaction. She understands the need for use of this induction method and desires to proceed. There is no evidence of cephalopelvic disproportion or fetal distress, or any contraindication to the induction technique selected.    I discussed the patients findings and my recommendations with patient, family, and nursing staff.         Ra Cast DO  23  17:43 EDT

## 2023-07-11 NOTE — PROCEDURES
COOK Catheter Placement     Indications: IOL    Post procedure diagnosis:  COBY    Procedure Details     The risks and benefits of the procedure and Verbal informed consent obtained.    Speculum placed in vagina and excellent visualization of cervix achieved. Cook catheter placed in standard fashion; inflating both balloons with sterile water.     FWB noted while placing Cook catheter.     Tolerated well  No apparent complication     Findings: Unfavorable CVX    Complications: pain.      Plan:  1) Continue with IOL   2) Anticipate ; Operative delivery as indicated   3) Pain medications as needed.       Ra Cast,    2023  19:26 EDT

## 2023-07-12 PROBLEM — Z34.90 PREGNANCY: Status: RESOLVED | Noted: 2023-07-11 | Resolved: 2023-07-12

## 2023-07-12 LAB — A1 MICROGLOB PLACENTAL VAG QL: NEGATIVE

## 2023-07-12 PROCEDURE — 25010000002 FENTANYL CITRATE (PF) 50 MCG/ML SOLUTION: Performed by: STUDENT IN AN ORGANIZED HEALTH CARE EDUCATION/TRAINING PROGRAM

## 2023-07-12 PROCEDURE — 59410 OBSTETRICAL CARE: CPT | Performed by: OBSTETRICS & GYNECOLOGY

## 2023-07-12 PROCEDURE — 84112 EVAL AMNIOTIC FLUID PROTEIN: CPT | Performed by: STUDENT IN AN ORGANIZED HEALTH CARE EDUCATION/TRAINING PROGRAM

## 2023-07-12 PROCEDURE — 25010000002 METHYLERGONOVINE MALEATE PER 0.2 MG: Performed by: STUDENT IN AN ORGANIZED HEALTH CARE EDUCATION/TRAINING PROGRAM

## 2023-07-12 RX ORDER — CARBOPROST TROMETHAMINE 250 UG/ML
250 INJECTION, SOLUTION INTRAMUSCULAR
Status: DISCONTINUED | OUTPATIENT
Start: 2023-07-12 | End: 2023-07-14 | Stop reason: HOSPADM

## 2023-07-12 RX ORDER — OXYTOCIN/0.9 % SODIUM CHLORIDE 30/500 ML
2-20 PLASTIC BAG, INJECTION (ML) INTRAVENOUS
Status: DISCONTINUED | OUTPATIENT
Start: 2023-07-12 | End: 2023-07-14 | Stop reason: HOSPADM

## 2023-07-12 RX ORDER — SODIUM CHLORIDE 0.9 % (FLUSH) 0.9 %
1-10 SYRINGE (ML) INJECTION AS NEEDED
Status: DISCONTINUED | OUTPATIENT
Start: 2023-07-12 | End: 2023-07-14 | Stop reason: HOSPADM

## 2023-07-12 RX ORDER — MISOPROSTOL 100 MCG
50 TABLET ORAL ONCE
Status: COMPLETED | OUTPATIENT
Start: 2023-07-12 | End: 2023-07-12

## 2023-07-12 RX ORDER — ACETAMINOPHEN 325 MG/1
650 TABLET ORAL EVERY 6 HOURS PRN
Status: DISCONTINUED | OUTPATIENT
Start: 2023-07-12 | End: 2023-07-14 | Stop reason: HOSPADM

## 2023-07-12 RX ORDER — MISOPROSTOL 200 UG/1
800 TABLET ORAL ONCE AS NEEDED
Status: DISCONTINUED | OUTPATIENT
Start: 2023-07-12 | End: 2023-07-14 | Stop reason: HOSPADM

## 2023-07-12 RX ORDER — HYDROCORTISONE 25 MG/G
1 CREAM TOPICAL AS NEEDED
Status: DISCONTINUED | OUTPATIENT
Start: 2023-07-12 | End: 2023-07-14 | Stop reason: HOSPADM

## 2023-07-12 RX ORDER — FENTANYL 0.2 MG/100ML-BUPIV 0.125%-NACL 0.9% EPIDURAL INJ 2/0.125 MCG/ML-%
SOLUTION INJECTION CONTINUOUS
Status: DISCONTINUED | OUTPATIENT
Start: 2023-07-12 | End: 2023-07-14 | Stop reason: HOSPADM

## 2023-07-12 RX ORDER — OXYTOCIN/0.9 % SODIUM CHLORIDE 30/500 ML
2-20 PLASTIC BAG, INJECTION (ML) INTRAVENOUS
Status: DISCONTINUED | OUTPATIENT
Start: 2023-07-12 | End: 2023-07-12

## 2023-07-12 RX ORDER — FERROUS SULFATE TAB EC 324 MG (65 MG FE EQUIVALENT) 324 (65 FE) MG
324 TABLET DELAYED RESPONSE ORAL
Status: DISCONTINUED | OUTPATIENT
Start: 2023-07-12 | End: 2023-07-14 | Stop reason: HOSPADM

## 2023-07-12 RX ORDER — METHYLERGONOVINE MALEATE 0.2 MG/ML
200 INJECTION INTRAVENOUS ONCE
Status: DISCONTINUED | OUTPATIENT
Start: 2023-07-13 | End: 2023-07-14 | Stop reason: HOSPADM

## 2023-07-12 RX ORDER — EPHEDRINE SULFATE 5 MG/ML
10 INJECTION INTRAVENOUS
Status: DISCONTINUED | OUTPATIENT
Start: 2023-07-12 | End: 2023-07-14 | Stop reason: HOSPADM

## 2023-07-12 RX ORDER — MISOPROSTOL 200 UG/1
600 TABLET ORAL ONCE
Status: DISCONTINUED | OUTPATIENT
Start: 2023-07-13 | End: 2023-07-14 | Stop reason: HOSPADM

## 2023-07-12 RX ORDER — PRENATAL VIT/IRON FUM/FOLIC AC 27MG-0.8MG
1 TABLET ORAL DAILY
Status: DISCONTINUED | OUTPATIENT
Start: 2023-07-13 | End: 2023-07-14 | Stop reason: HOSPADM

## 2023-07-12 RX ORDER — FENTANYL CITRATE 50 UG/ML
100 INJECTION, SOLUTION INTRAMUSCULAR; INTRAVENOUS
Status: DISCONTINUED | OUTPATIENT
Start: 2023-07-12 | End: 2023-07-14 | Stop reason: HOSPADM

## 2023-07-12 RX ORDER — IBUPROFEN 600 MG/1
600 TABLET ORAL EVERY 6 HOURS PRN
Status: DISCONTINUED | OUTPATIENT
Start: 2023-07-12 | End: 2023-07-14 | Stop reason: HOSPADM

## 2023-07-12 RX ORDER — HYDROCODONE BITARTRATE AND ACETAMINOPHEN 10; 325 MG/1; MG/1
1 TABLET ORAL EVERY 4 HOURS PRN
Status: DISCONTINUED | OUTPATIENT
Start: 2023-07-12 | End: 2023-07-14 | Stop reason: HOSPADM

## 2023-07-12 RX ORDER — HYDROCODONE BITARTRATE AND ACETAMINOPHEN 5; 325 MG/1; MG/1
1 TABLET ORAL EVERY 4 HOURS PRN
Status: DISCONTINUED | OUTPATIENT
Start: 2023-07-12 | End: 2023-07-14 | Stop reason: HOSPADM

## 2023-07-12 RX ORDER — METHYLERGONOVINE MALEATE 0.2 MG/ML
200 INJECTION INTRAVENOUS ONCE AS NEEDED
Status: COMPLETED | OUTPATIENT
Start: 2023-07-12 | End: 2023-07-12

## 2023-07-12 RX ORDER — DOCUSATE SODIUM 100 MG/1
100 CAPSULE, LIQUID FILLED ORAL 2 TIMES DAILY
Status: DISCONTINUED | OUTPATIENT
Start: 2023-07-13 | End: 2023-07-14 | Stop reason: HOSPADM

## 2023-07-12 RX ORDER — BISACODYL 10 MG
10 SUPPOSITORY, RECTAL RECTAL DAILY PRN
Status: DISCONTINUED | OUTPATIENT
Start: 2023-07-13 | End: 2023-07-14 | Stop reason: HOSPADM

## 2023-07-12 RX ORDER — SODIUM CHLORIDE 9 MG/ML
INJECTION, SOLUTION INTRAVENOUS
Status: COMPLETED
Start: 2023-07-12 | End: 2023-07-12

## 2023-07-12 RX ADMIN — Medication 999 ML/HR: at 23:29

## 2023-07-12 RX ADMIN — FENTANYL CITRATE 100 MCG: 50 INJECTION, SOLUTION INTRAMUSCULAR; INTRAVENOUS at 11:07

## 2023-07-12 RX ADMIN — Medication 50 MCG: at 09:04

## 2023-07-12 RX ADMIN — Medication 25 MCG: at 00:12

## 2023-07-12 RX ADMIN — SODIUM CHLORIDE, POTASSIUM CHLORIDE, SODIUM LACTATE AND CALCIUM CHLORIDE 1000 ML: 600; 310; 30; 20 INJECTION, SOLUTION INTRAVENOUS at 13:18

## 2023-07-12 RX ADMIN — FENTANYL CITRATE 100 MCG: 50 INJECTION, SOLUTION INTRAMUSCULAR; INTRAVENOUS at 09:00

## 2023-07-12 RX ADMIN — Medication 2 MILLI-UNITS/MIN: at 15:01

## 2023-07-12 RX ADMIN — METHYLERGONOVINE MALEATE 200 MCG: 0.2 INJECTION, SOLUTION INTRAMUSCULAR; INTRAVENOUS at 23:43

## 2023-07-12 RX ADMIN — IBUPROFEN 600 MG: 600 TABLET, FILM COATED ORAL at 23:57

## 2023-07-12 RX ADMIN — SODIUM CHLORIDE, POTASSIUM CHLORIDE, SODIUM LACTATE AND CALCIUM CHLORIDE 125 ML/HR: 600; 310; 30; 20 INJECTION, SOLUTION INTRAVENOUS at 14:26

## 2023-07-12 RX ADMIN — FERROUS SULFATE TAB EC 324 MG (65 MG FE EQUIVALENT) 324 MG: 324 (65 FE) TABLET DELAYED RESPONSE at 09:04

## 2023-07-12 RX ADMIN — SODIUM CHLORIDE, POTASSIUM CHLORIDE, SODIUM LACTATE AND CALCIUM CHLORIDE 125 ML/HR: 600; 310; 30; 20 INJECTION, SOLUTION INTRAVENOUS at 22:32

## 2023-07-12 RX ADMIN — SODIUM CHLORIDE 500 ML: 9 INJECTION, SOLUTION INTRAVENOUS at 18:38

## 2023-07-12 RX ADMIN — Medication 50 MCG: at 05:02

## 2023-07-12 NOTE — PROGRESS NOTES
"LABOR PROGRESS NOTE    S:  pt comfortable w/ epi.  CTSP for repetative, moderate variables.    O:  /67   Pulse 66   Temp 97.8 °F (36.6 °C) (Oral)   Resp 16   Ht 165.1 cm (65\")   Wt 84.8 kg (187 lb)   LMP 10/03/2022 (Exact Date)   BMI 31.12 kg/m²     FHTs CAT II    Cx:  5-6/90/-2    IUPC placed, amnioinfusion initiated     Cxts q 2-3 mins, pit off    A:  CAT II strip, remote from delivery.    P:  amnioinfusion for decels.  If no resolution, may be candidate for Prim C/S    Johny Vieyra MD  18:44 EDT  07/12/23      "

## 2023-07-12 NOTE — PROGRESS NOTES
4-5/70/-2 AROM clear   Pitocin per protocol; Epidural in place   FWB with moderate variability   Anticipate ; operative as indicated

## 2023-07-12 NOTE — PLAN OF CARE
Problem: Adult Inpatient Plan of Care  Goal: Plan of Care Review  Outcome: Ongoing, Progressing  Flowsheets (Taken 7/12/2023 8374)  Progress: improving  Plan of Care Reviewed With:   patient   significant other  Outcome Evaluation: VSS. cook catheter placed at 1926. pt recieved cytotec x3 overnight. pt has been able to rest comfortably b/w care. jeanine irregularly. pt reports feeling some contractions, but not all of them. membranes intact. support people at bedside overnight     Problem: Adult Inpatient Plan of Care  Goal: Patient-Specific Goal (Individualized)  Outcome: Ongoing, Progressing   Goal Outcome Evaluation:  Plan of Care Reviewed With: patient, significant other        Progress: improving  Outcome Evaluation: VSS. cook catheter placed at 1926. pt recieved cytotec x3 overnight. pt has been able to rest comfortably b/w care. jeanine irregularly. pt reports feeling some contractions, but not all of them. membranes intact. support people at bedside overnight

## 2023-07-12 NOTE — PLAN OF CARE
Problem: Adult Inpatient Plan of Care  Goal: Plan of Care Review  Outcome: Ongoing, Progressing  Flowsheets (Taken 7/12/2023 1803)  Progress: improving  Plan of Care Reviewed With: patient  Outcome Evaluation: VSS. ALIDA SMITHL. SVE 5-6/80/-1. pit at 6. AROM at 1442. continuing with POC  Goal: Patient-Specific Goal (Individualized)  Outcome: Ongoing, Progressing  Flowsheets (Taken 7/12/2023 1803)  Patient-Specific Goals (Include Timeframe): SVE at 1830  Individualized Care Needs: pt likes lights off  Anxieties, Fears or Concerns: pain control  Goal: Absence of Hospital-Acquired Illness or Injury  Outcome: Ongoing, Progressing  Intervention: Identify and Manage Fall Risk  Recent Flowsheet Documentation  Taken 7/12/2023 1634 by Davina Morgan RN  Safety Promotion/Fall Prevention: safety round/check completed  Taken 7/12/2023 0737 by Davina Morgan RN  Safety Promotion/Fall Prevention: safety round/check completed  Intervention: Prevent and Manage VTE (Venous Thromboembolism) Risk  Recent Flowsheet Documentation  Taken 7/12/2023 1634 by Davina Morgan RN  Activity Management: up ad geni  Taken 7/12/2023 0737 by Davina Morgan RN  Activity Management: up ad geni  Intervention: Prevent Infection  Recent Flowsheet Documentation  Taken 7/12/2023 1634 by Davina Morgan RN  Infection Prevention:   cohorting utilized   environmental surveillance performed   equipment surfaces disinfected   hand hygiene promoted   personal protective equipment utilized   rest/sleep promoted   single patient room provided   visitors restricted/screened  Taken 7/12/2023 0737 by Davina Morgan RN  Infection Prevention:   cohorting utilized   environmental surveillance performed   equipment surfaces disinfected   hand hygiene promoted   personal protective equipment utilized   rest/sleep promoted   single patient room provided   visitors restricted/screened  Goal: Optimal Comfort and Wellbeing  Outcome: Ongoing,  Progressing  Intervention: Monitor Pain and Promote Comfort  Recent Flowsheet Documentation  Taken 7/12/2023 1634 by Davina Morgan RN  Pain Management Interventions: see MAR  Taken 7/12/2023 1107 by Davina Morgan RN  Pain Management Interventions: see MAR  Taken 7/12/2023 0900 by Davina Morgan RN  Pain Management Interventions: see MAR  Taken 7/12/2023 0737 by Davina Morgan RN  Pain Management Interventions:   position adjusted   quiet environment facilitated   simple massage provided  Intervention: Provide Person-Centered Care  Recent Flowsheet Documentation  Taken 7/12/2023 0737 by Davina Morgan RN  Trust Relationship/Rapport:   care explained   choices provided   emotional support provided   empathic listening provided   questions answered   questions encouraged   reassurance provided   thoughts/feelings acknowledged  Goal: Readiness for Transition of Care  Outcome: Ongoing, Progressing     Problem: Bleeding (Labor)  Goal: Hemostasis  Outcome: Ongoing, Progressing     Problem: Change in Fetal Wellbeing (Labor)  Goal: Stable Fetal Wellbeing  Outcome: Ongoing, Progressing     Problem: Delayed Labor Progression (Labor)  Goal: Effective Progression to Delivery  Outcome: Ongoing, Progressing     Problem: Infection (Labor)  Goal: Absence of Infection Signs and Symptoms  Outcome: Ongoing, Progressing  Intervention: Prevent or Manage Infection  Recent Flowsheet Documentation  Taken 7/12/2023 1634 by Davina Morgan RN  Infection Prevention:   cohorting utilized   environmental surveillance performed   equipment surfaces disinfected   hand hygiene promoted   personal protective equipment utilized   rest/sleep promoted   single patient room provided   visitors restricted/screened  Taken 7/12/2023 0737 by Davina Morgan RN  Infection Prevention:   cohorting utilized   environmental surveillance performed   equipment surfaces disinfected   hand hygiene promoted   personal protective  equipment utilized   rest/sleep promoted   single patient room provided   visitors restricted/screened     Problem: Labor Pain (Labor)  Goal: Acceptable Pain Control  Outcome: Ongoing, Progressing     Problem: Uterine Tachysystole (Labor)  Goal: Normal Uterine Contraction Pattern  Outcome: Ongoing, Progressing   Goal Outcome Evaluation:  Plan of Care Reviewed With: patient        Progress: improving  Outcome Evaluation: VSS. TASHIAR SINDY. SVE 5-6/80/-1. pit at 6. AROM at 1442. continuing with POC

## 2023-07-13 LAB
HCT VFR BLD AUTO: 39.2 % (ref 34–46.6)
HGB BLD-MCNC: 12.5 G/DL (ref 12–15.9)

## 2023-07-13 PROCEDURE — 0503F POSTPARTUM CARE VISIT: CPT | Performed by: NURSE PRACTITIONER

## 2023-07-13 PROCEDURE — 85018 HEMOGLOBIN: CPT | Performed by: OBSTETRICS & GYNECOLOGY

## 2023-07-13 PROCEDURE — 85014 HEMATOCRIT: CPT | Performed by: OBSTETRICS & GYNECOLOGY

## 2023-07-13 RX ORDER — OXYTOCIN/0.9 % SODIUM CHLORIDE 30/500 ML
999 PLASTIC BAG, INJECTION (ML) INTRAVENOUS ONCE
Status: COMPLETED | OUTPATIENT
Start: 2023-07-13 | End: 2023-07-12

## 2023-07-13 RX ORDER — OXYTOCIN/0.9 % SODIUM CHLORIDE 30/500 ML
250 PLASTIC BAG, INJECTION (ML) INTRAVENOUS CONTINUOUS
Status: ACTIVE | OUTPATIENT
Start: 2023-07-13 | End: 2023-07-13

## 2023-07-13 RX ADMIN — WITCH HAZEL 1 PAD: 500 SOLUTION RECTAL; TOPICAL at 01:05

## 2023-07-13 RX ADMIN — DOCUSATE SODIUM 100 MG: 100 CAPSULE, LIQUID FILLED ORAL at 20:46

## 2023-07-13 RX ADMIN — FERROUS SULFATE TAB EC 324 MG (65 MG FE EQUIVALENT) 324 MG: 324 (65 FE) TABLET DELAYED RESPONSE at 08:23

## 2023-07-13 RX ADMIN — IBUPROFEN 600 MG: 600 TABLET, FILM COATED ORAL at 19:14

## 2023-07-13 RX ADMIN — IBUPROFEN 600 MG: 600 TABLET, FILM COATED ORAL at 08:23

## 2023-07-13 RX ADMIN — PRENATAL VIT W/ FE FUMARATE-FA TAB 27-0.8 MG 1 TABLET: 27-0.8 TAB at 08:23

## 2023-07-13 NOTE — L&D DELIVERY NOTE
[facility]   Vaginal Delivery Note    Patient Name: Gela Valladares  : 2000  MRN: [id number]    Date of Delivery:     Diagnosis     Pre & Post-Delivery:  Intrauterine pregnancy at 40w2d  Labor status: Induced Onset of Labor     Normal vaginal delivery    Prenatal care in third trimester    Rubella non-immune status, antepartum             Problem List    Transfer to Postpartum     Review the Delivery Report for details.     Delivery     Delivery:      Date of Birth:     Time of Birth:  Gestational Age   40w2d     Anesthesia: Epidural     Delivering clinician: Johny Kapoor Nambe    Forceps?   No   Vacuum? No    Shoulder dystocia present: No        Delivery narrative:    DELIVERY NOTE  22 y.o.  @ 40w2d    Pt pushed with reactive fetal heart tones till she was C/C/+3; was prepped and draped in the usual fashion.      Pt was delivered of 1 live viable female, from the CB position, over an intact perineum.  Infant was bulb suctioned on the perineum.  There was a loose nuchal cord.  After uneventful delivery of shoulders, infant was completely delivered and placed on maternal chest for kangaroo care.  Cord was doubly clamped and divided and cord blood drawn.  Placenta was delivered spontaneously, intact with 3 vessel cord.  Lacs:  yes.  Repairs yes.  Pt tolerated procedure well and stayed in LDR in satisfactory condition.  All sponge, instrument and needle counts were correct x 3 according to staff.        Infant     Findings: adult  infant     Infant observations: Weight: No birth weight on file.   Length:   in  Observations/Comments:        Apgars: 8  @ 1 minute /    9  @ 5 minutes   Infant Name:      Placenta & Cord         Placenta delivered    at        Cord:   present.   Nuchal Cord?  yes; Number of nuchal loops present:   1   Cord blood obtained:     Cord gases obtained:      Cord gas results: Venous:  No results found for: PHCVEN    Arterial:  No results found for: PHCART     Repair      Episiotomy: Not recorded     No    Lacerations: Yes  Laceration Information  Laceration Repaired?   Perineal:       Periurethral:       Labial:       Sulcus:       Vaginal:  R sidewall     Cervical:         Suture used for repair: 3-0 chromic gut   Estimated Blood Loss:  300cc     Quantitative Blood Loss:          Complications     none    Disposition     Mother to Mother Baby/Postpartum  in stable condition currently.  Baby to NBN  in stable condition currently.    Johny Vieyra MD  07/12/23  23:49 EDT

## 2023-07-13 NOTE — PLAN OF CARE
Problem: Adult Inpatient Plan of Care  Goal: Plan of Care Review  Outcome: Ongoing, Progressing  Flowsheets (Taken 7/13/2023 0641)  Progress: improving  Plan of Care Reviewed With:   patient   significant other  Outcome Evaluation: pt vaginally delivered at 2325 last night w/out complications. ambulating well w/out difficulty. VSS. fundus firm, bleeding scant. pt has reported no pain this AM. bonding appropriately w/ infant.     Problem: Adult Inpatient Plan of Care  Goal: Patient-Specific Goal (Individualized)  Outcome: Ongoing, Progressing  Flowsheets (Taken 7/13/2023 0641)  Individualized Care Needs: likes ice water   Goal Outcome Evaluation:  Plan of Care Reviewed With: patient, significant other        Progress: improving  Outcome Evaluation: pt vaginally delivered at 2325 last night w/out complications. ambulating well w/out difficulty. VSS. fundus firm, bleeding scant. pt has reported no pain this AM. bonding appropriately w/ infant.

## 2023-07-13 NOTE — PAYOR COMM NOTE
"Gela Fuchs (22 y.o. Female)     ATTN: NURSE REVIEWER  RE: MATERNITY INPATIENT AUTH REQUEST  REF#[id number]  PLS REPLY TO  [phone] OR MONICA [phone] FAX# [phone]           Date of Birth   2000    Social Security Number       Address   [address] [place] [address]    Home Phone   [phone]    MRN   [id number]       Denominational   None    Marital Status   Single                            Admission Date   7/11/23    Admission Type   Elective    Admitting Provider   Ra Cast DO    Attending Provider   Ra Cast DO    Department, Room/Bed   [facility], 1123/1       Discharge Date       Discharge Disposition       Discharge Destination                                 Attending Provider: Ra Cast DO    Allergies: No Known Allergies    Isolation: None   Infection: None   Code Status: CPR    Ht: 165.1 cm (65\")   Wt: 84.8 kg (187 lb)    Admission Cmt: None   Principal Problem: Normal vaginal delivery [O80]                   Active Insurance as of 7/11/2023       Primary Coverage       Payor Plan Insurance Group Employer/Plan Group    [employer] [place] [employer] [place]        Payor Plan Address Payor Plan Phone Number Payor Plan Fax Number Effective Dates    [address] [address] [phone]  2/26/2020 - None Entered    [facility] [id number]         Subscriber Name Subscriber Birth Date Member ID       GELA FUCHS 2000 [id number]                     Emergency Contacts        (Rel.) Home Phone Work Phone Mobile Phone    LINDA LEIVA (Mother) -- -- [phone]                 History & Physical        Ra Cast DO at 07/11/23 1743             Patient Care Team:  Provider, No Known as PCP - General    Chief complaint IOL       HPI: 21yo G1 @ 40+1 wga presents for IOL . Denies VB, LOF or reg ctx. + FM     ROS:   Constitutional: Weight change and appetite change present.   Respiratory: Negative for cough and shortness of " breath.    Cardiovascular: Negative for chest pain and palpitations.   Gastrointestinal: Negative    Endocrine: Negative.    Genitourinary: + missed menses, no dysuria   Skin: Negative.    Neurological: Negative for dizziness and headaches.   Hematological: Negative.    Psychiatric/Behavioral: Negative for dysphoric mood and sleep disturbance. The patient is not nervous/anxious.        PMH:   Past Medical History:   Diagnosis Date    ADHD     Anemia          PSH: No past surgical history on file.    SoHx:   Social History     Socioeconomic History    Marital status: Single   Tobacco Use    Smoking status: Never    Smokeless tobacco: Never   Vaping Use    Vaping Use: Never used   Substance and Sexual Activity    Alcohol use: Never    Drug use: Never    Sexual activity: Yes       FHx: No family history on file.    PGyn Hx: See office notes     POBHx: G1     Allergies: Patient has no known allergies.    Medications:   No current facility-administered medications on file prior to encounter.     Current Outpatient Medications on File Prior to Encounter   Medication Sig Dispense Refill    doxylamine (UNISOM) 25 MG tablet Take 1 tablet by mouth At Night As Needed for Sleep. 60 tablet 2    ferrous sulfate 325 (65 FE) MG tablet Take 1 tablet by mouth Daily With Breakfast.      Prenatal Vit-Fe Fumarate-FA (Prenatal Vitamin) 27-0.8 MG tablet Take 1 tablet by mouth Daily. 30 tablet 6    vitamin B-6 (PYRIDOXINE) 25 MG tablet Take 1 tablet by mouth Every Night. 60 tablet 1             Vital Signs       Physical Exam:  Constitutional: She is oriented to person, place, and time. She appears well-developed and well-nourished.   HENT:   Head: Normocephalic and atraumatic.   Cardiovascular: Normal rate and regular rhythm.    Pulmonary/Chest: Effort normal. No respiratory distress.   Abdominal: Soft. Bowel sounds are normal. There is no tenderness. There is no rebound and no guarding.   Musculoskeletal: Normal range of motion.    Neurological: She is alert and oriented to person, place, and time.   Skin: Skin is warm and dry.   Psychiatric: She has a normal mood and affect. Her behavior is normal. Judgment and thought content normal.   Nursing note and vitals reviewed.  Debilities/Disabilities Identified: None  Emotional Behavior: Appropriate    Labs: Pending       Assessment/Plan:   23yo G1P at 40+1 presents for IOL.  ANC uncomplicated.  Maternal VSS WNL.   FWB demonstrated by reactive cat I NST.  SVE /-3.  Cephalic by TAUS.   Anticipate .    -Admit to L&D.  Consent signed and on chart.  -PIV, LR.  -T&S, CBC.  -Anesthesia consult.    -GBS Neg; prophylaxis not indicated.  -MBT A+; Rhophylac not indicated.  -Rubella NI; MMR  indicated.  -Induce via Cytotec/FB and Pitocin/amniotomy as indicated.  -CD for maternal/fetal indications.    Consent for Vaginal Delivery  Pt. counseled as to risk of labor and delivery including but not limited to infection, bleeding (with possible need for blood transfusion, and its inherent risks of virus transmission, i.e. HIV, Hepatitis; possible transfusion reaction), possible emergent  section with its risks of damage to internal organs and necessary repairs, possible operative vaginal delivery, NON routine use of episiotomy and routine use of internal monitors and associated risks, anesthetic complications, injury to infant or mother at time of delivery, maternal or fetal death.  The pt. understands these risks and is willing to proceed.  All of her questions were answered.    Induction of Labor  Risks have been outlined to include (but not limited to) infection, uterine tetany with uterine rupture and/or fetal distress, need for emergent  delivery and possible  hysterectomy, possible postpartum hemorrhage secondary to uterine atony which could be substantial enough to require blood transfusion with the inherent risks of hepatitis, AIDS, HIV infection as well as transfusion  reaction. She understands the need for use of this induction method and desires to proceed. There is no evidence of cephalopelvic disproportion or fetal distress, or any contraindication to the induction technique selected.    I discussed the patients findings and my recommendations with patient, family, and nursing staff.         Ra Cast DO  23  17:43 EDT           Electronically signed by Ra Cast DO at 23 1745          Operative/Procedure Notes (last 48 hours)        Ra Cast DO at 23 1926          COOK Catheter Placement     Indications: IOL    Post procedure diagnosis:  COBY    Procedure Details     The risks and benefits of the procedure and Verbal informed consent obtained.    Speculum placed in vagina and excellent visualization of cervix achieved. Cook catheter placed in standard fashion; inflating both balloons with sterile water.     FWB noted while placing Cook catheter.     Tolerated well  No apparent complication     Findings: Unfavorable CVX    Complications: pain.      Plan:  1) Continue with IOL   2) Anticipate ; Operative delivery as indicated   3) Pain medications as needed.       Ra Cast DO   2023  19:26 EDT     Electronically signed by Ra Cast DO at 23 1926       Johny Vieyra MD at 23 0427           [facility]   Vaginal Delivery Note    Patient Name: Gela Valladares  : 2000  MRN: [id number]    Date of Delivery:     Diagnosis     Pre & Post-Delivery:  Intrauterine pregnancy at 40w2d  Labor status: Induced Onset of Labor     Normal vaginal delivery    Prenatal care in third trimester    Rubella non-immune status, antepartum             Problem List    Transfer to Postpartum     Review the Delivery Report for details.     Delivery     Delivery:      Date of Birth:     Time of Birth:  Gestational Age   40w2d     Anesthesia: Epidural     Delivering clinician: Johny Vieyra     Forceps?   No   Vacuum? No    Shoulder dystocia present: No        Delivery narrative:    DELIVERY NOTE  22 y.o.  @ 40w2d    Pt pushed with reactive fetal heart tones till she was C/C/+3; was prepped and draped in the usual fashion.      Pt was delivered of 1 live viable female, from the CB position, over an intact perineum.  Infant was bulb suctioned on the perineum.  There was a loose nuchal cord.  After uneventful delivery of shoulders, infant was completely delivered and placed on maternal chest for kangaroo care.  Cord was doubly clamped and divided and cord blood drawn.  Placenta was delivered spontaneously, intact with 3 vessel cord.  Lacs:  yes.  Repairs yes.  Pt tolerated procedure well and stayed in LDR in satisfactory condition.  All sponge, instrument and needle counts were correct x 3 according to staff.        Infant     Findings: adult  infant     Infant observations: Weight: No birth weight on file.   Length:   in  Observations/Comments:        Apgars: 8  @ 1 minute /    9  @ 5 minutes   Infant Name:      Placenta & Cord         Placenta delivered    at        Cord:   present.   Nuchal Cord?  yes; Number of nuchal loops present:   1   Cord blood obtained:     Cord gases obtained:      Cord gas results: Venous:  No results found for: PHCVEN    Arterial:  No results found for: PHCART     Repair     Episiotomy: Not recorded     No    Lacerations: Yes  Laceration Information  Laceration Repaired?   Perineal:       Periurethral:       Labial:       Sulcus:       Vaginal:  R sidewall     Cervical:         Suture used for repair: 3-0 chromic gut   Estimated Blood Loss:  300cc     Quantitative Blood Loss:          Complications     none    Disposition     Mother to Mother Baby/Postpartum  in stable condition currently.  Baby to NBN  in stable condition currently.    Johny Vieyra MD  23  23:49 EDT          Electronically signed by Johny Vieyra MD at 23 0924    "       Physician Progress Notes (last 48 hours)        Dorothy Davis, APRN at 07/13/23 0824           [facility]  Vaginal Delivery Progress Note    Subjective   Subjective  Postpartum Day 1: Vaginal Delivery    The patient feels well.  Her pain is well controlled with nonsteroidal anti-inflammatory drugs.   She is ambulating well.  Patient describes her bleeding as thin lochia.    Breastfeeding: declines.    Objective     Objective   Vital Signs Range for the last 24 hours  Temperature: Temp:  [97.7 °F (36.5 °C)-98.3 °F (36.8 °C)] 98.2 °F (36.8 °C)   Temp Source: Temp src: Oral   BP: BP: (100-155)/(50-88) 125/78   Pulse: Heart Rate:  [] 70   Respirations: Resp:  [16-22] 16   SPO2: SpO2:  [98 %] 98 %   O2 Amount (l/min):     O2 Devices     Weight:       Admit Height:  Height: 165.1 cm (65\")    Physical Exam:  General:  no acute distresss.  Abdomen: abdomen is soft without significant tenderness, masses, organomegaly or guarding. Fundus: appropriate, firm, non tender BS+  Extremities: normal, atraumatic, no cyanosis, and trace edema.   No pain, neg Pepper's    Lab results reviewed:  Yes   Rubella:  No results found for: RUBELLAIGGIN Nurse Transcribed from prenatal record --    Rubella Antibodies, IgG   Date Value Ref Range Status   12/02/2022 <0.90 (L) Immune >0.99 index Final     Comment:                                     Non-immune       <0.90                                  Equivocal  0.90 - 0.99                                  Immune           >0.99       Rh Status:    RH type   Date Value Ref Range Status   07/11/2023 Positive  Final     Rh Factor   Date Value Ref Range Status   12/02/2022 Positive  Final     Comment:     Please note: Prior records for this patient's ABO / Rh type are not  available for additional verification.       Immunizations: There is no immunization history for the selected administration types on file for this patient.    Assessment & Plan   Assessment & Plan    Normal " "vaginal delivery    Prenatal care in third trimester    Rubella non-immune status, antepartum      Gela Valladares is Day 1  post-partum    Vaginal, Spontaneous       Plan:  1) post-partum Day #1 s/p     2) post-partum care- advance as tolerated    3) Girl- bottle    4) RNI- offer MMR at discharge    5) Dispo- plan home tomorrow      DOLLY Middleton  2023  08:24 EDT        Electronically signed by Dorothy Davis APRN at 23 0913       Johny Vieyra MD at 23 1841          LABOR PROGRESS NOTE    S:  pt comfortable w/ epi.  CTSP for repetative, moderate variables.    O:  /67   Pulse 66   Temp 97.8 °F (36.6 °C) (Oral)   Resp 16   Ht 165.1 cm (65\")   Wt 84.8 kg (187 lb)   LMP 10/03/2022 (Exact Date)   BMI 31.12 kg/m²     FHTs CAT II    Cx:  5-6/90/-2    IUPC placed, amnioinfusion initiated     Cxts q 2-3 mins, pit off    A:  CAT II strip, remote from delivery.    P:  amnioinfusion for decels.  If no resolution, may be candidate for Prim C/S    Johny Vieyra MD  18:44 EDT  23        Electronically signed by Johny Vieyra MD at 23 1844       Ra Cast DO at 23 1445          4-5/70/-2 AROM clear   Pitocin per protocol; Epidural in place   FWB with moderate variability   Anticipate ; operative as indicated     Electronically signed by Ra Cast DO at 23 1447       Consult Notes (last 48 hours)  Notes from 23 1153 through 23 1153   No notes of this type exist for this encounter.       Maternal Vitals (last 2 days)       Date/Time Temp Pulse Resp BP SpO2 Weight    23 0811 98.2 (36.8) 70 16 125/78 98 --    23 0619 98.3 (36.8) 73 16 130/72 -- --    23 0139 -- 67 16 118/56 -- --    23 0104 -- 75 -- 127/77 -- --    23 0048 -- 65 -- 132/60 -- --    23 0033 -- 80 18 137/69 -- --    23 0018 -- 75 18 125/77 -- --    23 0003 98.3 (36.8) 70 18 130/63 -- " --    07/12/23 2357 -- 78 -- 122/57 -- --    07/12/23 2348 -- 85 20 152/65 -- --    07/12/23 2339 -- 93 22 155/63 -- --    07/12/23 2319 -- 100 -- 143/78 -- --    07/12/23 2303 -- 84 -- 117/63 -- --    07/12/23 2248 -- 88 -- 134/61 -- --    07/12/23 2243 -- 91 -- 131/61 -- --    07/12/23 2238 -- 101 -- 134/60 -- --    07/12/23 2233 -- 82 -- 134/66 -- --    07/12/23 2232 -- 82 -- 134/66 -- --    07/12/23 2228 -- 117 -- 125/60 -- --    07/12/23 2222 -- 98 -- 125/65 -- --    07/12/23 2217 -- 109 -- 119/55 -- --    07/12/23 2212 -- 114 -- 131/59 -- --    07/12/23 2207 -- 88 -- 119/58 -- --    07/12/23 2202 -- 78 -- 113/57 -- --    07/12/23 2152 -- 78 -- 115/63 -- --    07/12/23 2151 -- 78 -- 115/63 -- --    07/12/23 2147 -- 90 -- 119/67 -- --    07/12/23 2142 -- 85 -- 116/69 -- --    07/12/23 2138 -- 87 -- 123/67 -- --    07/12/23 2137 -- 82 -- 109/50 -- --    07/12/23 2118 97.7 (36.5) 61 18 110/56 -- --    07/12/23 2104 -- 69 -- 110/57 -- --    07/12/23 2049 -- 65 -- 106/57 -- --    07/12/23 2040 -- 80 -- 100/50 -- --    07/12/23 2018 -- 83 -- 124/64 -- --    07/12/23 2003 -- 83 -- 125/65 -- --    07/12/23 1948 -- 84 -- 108/53 -- --    07/12/23 1940 -- 88 -- 113/55 -- --    07/12/23 1918 97.9 (36.6) 88 18 109/63 -- --    07/12/23 1903 -- 72 -- 108/56 -- --    07/12/23 1733 -- 66 -- 118/67 -- --    07/12/23 1718 -- 67 -- 126/65 -- --    07/12/23 1648 -- 73 -- 134/60 -- --    07/12/23 1634 97.8 (36.6) 65 -- 123/60 -- --    07/12/23 1619 -- 51 -- 100/53 -- --    07/12/23 1604 -- 63 -- 103/58 -- --    07/12/23 1549 -- 66 -- 139/76 -- --    07/12/23 1533 -- 60 -- 108/67 -- --    07/12/23 1518 -- 71 -- 116/73 -- --    07/12/23 1503 -- 72 -- 116/68 -- --    07/12/23 1447 98.2 (36.8) 71 -- 123/67 -- --    07/12/23 1434 -- 78 -- 126/70 -- --    07/12/23 1416 -- 85 -- 124/67 -- --    07/12/23 1413 -- 72 -- 126/74 -- --    07/12/23 1410 -- 86 -- 137/77 -- --    07/12/23 1407 -- 86 -- 135/88 -- --    07/12/23 1353 -- 99 -- 153/87  -- --    07/12/23 1322 97.7 (36.5) 75 16 122/68 -- --    07/12/23 0737 98 (36.7) 69 16 117/70 -- --    07/12/23 0643 -- 64 16 134/71 -- --    07/12/23 0547 97.8 (36.6) 75 16 122/79 -- --    07/12/23 0453 -- 62 16 103/61 -- --    07/12/23 0352 -- 81 16 123/64 -- --    07/12/23 0247 -- 56 16 120/65 -- --    07/12/23 0147 98.3 (36.8) 67 16 111/66 -- --    07/12/23 0014 -- 59 16 113/64 -- --    07/11/23 2315 -- 85 16 129/73 -- --    07/11/23 2038 98.3 (36.8) 78 18 123/69 -- --    07/11/23 1746 97.7 (36.5) 96 17 133/82 -- 84.8 (187)          FHR (last 2 days)       Date/Time Fetal HR Assessment Method Fetal HR (beats/min) Fetal HR Baseline Fetal HR Variability Fetal HR Accelerations Fetal HR Decelerations Fetal HR Tracing Category    07/12/23 2325 fetal spiral electrode 130 normal range moderate (amplitude range 6 to 25 bpm) greater than/equal to 15 bpm;lasting at least 15 seconds variable --    07/12/23 2300 fetal spiral electrode 130 normal range moderate (amplitude range 6 to 25 bpm) greater than/equal to 15 bpm;lasting at least 15 seconds variable --    07/12/23 2230 fetal spiral electrode 125 normal range moderate (amplitude range 6 to 25 bpm) greater than/equal to 15 bpm;lasting at least 15 seconds variable;late --    07/12/23 2200 fetal spiral electrode 120 normal range moderate (amplitude range 6 to 25 bpm) greater than/equal to 15 bpm;lasting at least 15 seconds early;variable --    07/12/23 2130 fetal spiral electrode 120 normal range moderate (amplitude range 6 to 25 bpm) greater than/equal to 15 bpm;lasting at least 15 seconds late --    07/12/23 2100 fetal spiral electrode 120 normal range moderate (amplitude range 6 to 25 bpm) greater than/equal to 15 bpm;lasting at least 15 seconds late --    07/12/23 2030 fetal spiral electrode 115 normal range moderate (amplitude range 6 to 25 bpm) greater than/equal to 15 bpm;lasting at least 15 seconds absent --    07/12/23 2000 fetal spiral electrode 115 normal range  moderate (amplitude range 6 to 25 bpm) greater than/equal to 15 bpm;lasting at least 15 seconds variable --    07/12/23 1930 fetal spiral electrode 125 normal range moderate (amplitude range 6 to 25 bpm) greater than/equal to 15 bpm;lasting at least 15 seconds absent --    07/12/23 1900 external 120 normal range moderate (amplitude range 6 to 25 bpm) greater than/equal to 15 bpm;lasting at least 15 seconds variable;early --    07/12/23 1830 external 115 normal range moderate (amplitude range 6 to 25 bpm) greater than/equal to 15 bpm;lasting at least 15 seconds variable;early --    07/12/23 1800 external 125 normal range moderate (amplitude range 6 to 25 bpm) greater than/equal to 15 bpm;lasting at least 15 seconds variable;early --    07/12/23 1730 external 120 normal range moderate (amplitude range 6 to 25 bpm) greater than/equal to 15 bpm;lasting at least 15 seconds variable --    07/12/23 1700 external 120 normal range moderate (amplitude range 6 to 25 bpm) greater than/equal to 15 bpm;lasting at least 15 seconds absent --    07/12/23 1630 external 125 normal range moderate (amplitude range 6 to 25 bpm) greater than/equal to 15 bpm;lasting at least 15 seconds variable --    07/12/23 1600 external 125 normal range moderate (amplitude range 6 to 25 bpm) greater than/equal to 15 bpm;lasting at least 15 seconds variable --    07/12/23 1500 external 130 normal range moderate (amplitude range 6 to 25 bpm) greater than/equal to 15 bpm;lasting at least 15 seconds early;variable --    07/12/23 1430 external 125 normal range moderate (amplitude range 6 to 25 bpm) greater than/equal to 15 bpm;lasting at least 15 seconds absent --    07/12/23 1400 external 120 normal range moderate (amplitude range 6 to 25 bpm) greater than/equal to 15 bpm;lasting at least 15 seconds absent --    07/12/23 1330 external 125 normal range moderate (amplitude range 6 to 25 bpm) greater than/equal to 15 bpm;lasting at least 15 seconds absent --     07/12/23 1300 external 120 normal range moderate (amplitude range 6 to 25 bpm) greater than/equal to 15 bpm;lasting at least 15 seconds absent --    07/12/23 1230 external 115 normal range moderate (amplitude range 6 to 25 bpm) greater than/equal to 15 bpm;lasting at least 15 seconds absent --    07/12/23 1200 external 115 normal range moderate (amplitude range 6 to 25 bpm) greater than/equal to 15 bpm;lasting at least 15 seconds absent --    07/12/23 1130 external 115 normal range moderate (amplitude range 6 to 25 bpm) greater than/equal to 15 bpm;lasting at least 15 seconds absent --    07/12/23 1100 external 115 normal range moderate (amplitude range 6 to 25 bpm) greater than/equal to 15 bpm;lasting at least 15 seconds absent --    07/12/23 1030 external 120 normal range moderate (amplitude range 6 to 25 bpm) greater than/equal to 15 bpm;lasting at least 15 seconds absent --    07/12/23 1000 external 120 normal range moderate (amplitude range 6 to 25 bpm) greater than/equal to 15 bpm;lasting at least 15 seconds absent --    07/12/23 0930 external 120 normal range moderate (amplitude range 6 to 25 bpm) greater than/equal to 15 bpm;lasting at least 15 seconds absent --    07/12/23 0900 external 130 normal range moderate (amplitude range 6 to 25 bpm) greater than/equal to 15 bpm;lasting at least 15 seconds absent --    07/12/23 0830 external 140 normal range moderate (amplitude range 6 to 25 bpm) greater than/equal to 15 bpm;lasting at least 15 seconds absent --    07/12/23 0730 external 130 normal range moderate (amplitude range 6 to 25 bpm) greater than/equal to 15 bpm;lasting at least 15 seconds absent --    07/12/23 0700 external 125 normal range moderate (amplitude range 6 to 25 bpm) greater than/equal to 15 bpm;lasting at least 15 seconds absent --    07/12/23 0630 external 130 normal range minimal (detectable, amplitude less than or equal to 5 bpm) greater than/equal to 15 bpm;lasting at least 15 seconds  absent --    07/12/23 0600 external 130 normal range moderate (amplitude range 6 to 25 bpm) greater than/equal to 15 bpm;lasting at least 15 seconds absent --    07/12/23 0530 external 130 normal range moderate (amplitude range 6 to 25 bpm) greater than/equal to 15 bpm;lasting at least 15 seconds absent --    07/12/23 0500 external 130 normal range moderate (amplitude range 6 to 25 bpm) greater than/equal to 15 bpm;lasting at least 15 seconds absent --    07/12/23 0430 external 130 normal range minimal (detectable, amplitude less than or equal to 5 bpm) greater than/equal to 15 bpm;lasting at least 15 seconds absent --    07/12/23 0400 external 125 normal range moderate (amplitude range 6 to 25 bpm) greater than/equal to 15 bpm;lasting at least 15 seconds absent --    07/12/23 0330 external 130 normal range minimal (detectable, amplitude less than or equal to 5 bpm) greater than/equal to 15 bpm;lasting at least 15 seconds absent --    07/12/23 0300 external 120 normal range moderate (amplitude range 6 to 25 bpm) greater than/equal to 15 bpm;lasting at least 15 seconds absent --    07/12/23 0230 external 125 normal range moderate (amplitude range 6 to 25 bpm) greater than/equal to 15 bpm;lasting at least 15 seconds absent --    07/12/23 0200 external 125 normal range moderate (amplitude range 6 to 25 bpm) greater than/equal to 15 bpm;lasting at least 15 seconds absent --    07/12/23 0130 external 140 normal range moderate (amplitude range 6 to 25 bpm) greater than/equal to 15 bpm;lasting at least 15 seconds absent --    07/12/23 0100 external 135 normal range moderate (amplitude range 6 to 25 bpm) greater than/equal to 15 bpm;lasting at least 15 seconds absent --    07/12/23 0030 external 130 normal range moderate (amplitude range 6 to 25 bpm) greater than/equal to 15 bpm;lasting at least 15 seconds absent --    07/12/23 0000 external 130 normal range moderate (amplitude range 6 to 25 bpm) greater than/equal to 15  bpm;lasting at least 15 seconds absent --    07/11/23 2330 external 125 normal range moderate (amplitude range 6 to 25 bpm) greater than/equal to 15 bpm;lasting at least 15 seconds absent --    07/11/23 2300 external 135 normal range moderate (amplitude range 6 to 25 bpm) greater than/equal to 15 bpm;lasting at least 15 seconds absent --    07/11/23 2230 external 135 normal range moderate (amplitude range 6 to 25 bpm) absent absent --    07/11/23 2200 external 130 normal range moderate (amplitude range 6 to 25 bpm) greater than/equal to 15 bpm;lasting at least 15 seconds absent --    07/11/23 2130 external 130 normal range moderate (amplitude range 6 to 25 bpm) greater than/equal to 15 bpm;lasting at least 15 seconds absent --    07/11/23 2100 external 135 normal range moderate (amplitude range 6 to 25 bpm) greater than/equal to 15 bpm;lasting at least 15 seconds absent --    07/11/23 2030 external 135 normal range minimal (detectable, amplitude less than or equal to 5 bpm) absent absent --    07/11/23 2000 external 140 normal range moderate (amplitude range 6 to 25 bpm) absent absent --    07/11/23 1930 external 135 normal range moderate (amplitude range 6 to 25 bpm) greater than/equal to 15 bpm;lasting at least 15 seconds absent --    07/11/23 1800 external 125 normal range minimal (detectable, amplitude less than or equal to 5 bpm) absent absent --    07/11/23 1749 external 130 normal range moderate (amplitude range 6 to 25 bpm) absent absent --          Uterine Activity (last 2 days)       Date/Time Method Contraction Frequency (Minutes) Contraction Duration (sec) Contraction Intensity Uterine Resting Tone Contraction Pattern    07/12/23 2325 palpation;per patient report;IUPC (intrauterine pressure catheter) 2-3  moderate by palpation soft by palpation --    07/12/23 2300 palpation;per patient report;IUPC (intrauterine pressure catheter) 2-3.5  moderate by palpation soft by palpation --    07/12/23  2230 palpation;per patient report;IUPC (intrauterine pressure catheter) 2-4  moderate by palpation soft by palpation --    07/12/23 2200 palpation;per patient report;IUPC (intrauterine pressure catheter) 2-3  moderate by palpation soft by palpation --    07/12/23 2130 palpation;per patient report;IUPC (intrauterine pressure catheter) 1.5-4  moderate by palpation soft by palpation --    07/12/23 2100 palpation;per patient report;IUPC (intrauterine pressure catheter) 2-3  moderate by palpation soft by palpation --    07/12/23 2030 palpation;per patient report;IUPC (intrauterine pressure catheter) 1.5-3.5  moderate by palpation soft by palpation --    07/12/23 2000 palpation;per patient report;IUPC (intrauterine pressure catheter) 1.5-3  moderate by palpation soft by palpation --    07/12/23 1930 palpation;per patient report;IUPC (intrauterine pressure catheter) 2-4 100-130 moderate by palpation soft by palpation --    07/12/23 1900 palpation;per patient report;IUPC (intrauterine pressure catheter) 1.5-4  moderate by palpation soft by palpation --    07/12/23 1830 palpation;per patient report;external tocotransducer 2-3.5  moderate by palpation soft by palpation --    07/12/23 1800 palpation;per patient report;external tocotransducer 1.5-3.5  moderate by palpation soft by palpation --    07/12/23 1730 palpation;per patient report;external tocotransducer 2-4 40-80 moderate by palpation soft by palpation --    07/12/23 1700 palpation;per patient report;external tocotransducer 1-3 40-80 moderate by palpation soft by palpation --    07/12/23 1630 palpation;per patient report;external tocotransducer 1.5-3.5 40-80 moderate by palpation soft by palpation --    07/12/23 1600 palpation;per patient report;external tocotransducer 1.5-3 40-80 moderate by palpation soft by palpation --    07/12/23 1500 palpation;per patient report;external tocotransducer 1.5-4.5 40-80 moderate  by palpation soft by palpation --    07/12/23 1430 palpation;per patient report;external tocotransducer 1.5-3 40-80 mild by palpation soft by palpation --    07/12/23 1400 palpation;per patient report;external tocotransducer 1-5 40-80 mild by palpation soft by palpation --    07/12/23 1330 palpation;per patient report;external tocotransducer 2-4  mild by palpation soft by palpation --    07/12/23 1300 palpation;per patient report;external tocotransducer 2-4  mild by palpation soft by palpation --    07/12/23 1230 palpation;per patient report;external tocotransducer 1.5-4.5  mild by palpation soft by palpation --    07/12/23 1200 palpation;per patient report;external tocotransducer 1.5-6 40-80 mild by palpation soft by palpation --    07/12/23 1130 palpation;per patient report;external tocotransducer 2-5 40-80 mild by palpation soft by palpation --    07/12/23 1100 palpation;per patient report;external tocotransducer 2-9 40-60 mild by palpation soft by palpation --    07/12/23 1030 palpation;per patient report;external tocotransducer 5-21 40-60 mild by palpation soft by palpation --    07/12/23 1000 palpation;per patient report;external tocotransducer 1.5-7 40-80 mild by palpation soft by palpation --    07/12/23 0930 palpation;per patient report;external tocotransducer 1.5-8 40-80 mild by palpation soft by palpation --    07/12/23 0900 palpation;per patient report;external tocotransducer 1-6 40-80 mild by palpation soft by palpation --    07/12/23 0830 palpation;per patient report;external tocotransducer 1-4  mild by palpation soft by palpation --    07/12/23 0730 palpation;per patient report;external tocotransducer 1 x30 min 80 mild by palpation soft by palpation --    07/12/23 0700 external tocotransducer;per patient report;palpation -- 50-60 mild by palpation soft by palpation --    07/12/23 0630 external tocotransducer;per patient report;palpation 1.5-5 40-80 mild by palpation soft by  palpation --    07/12/23 0600 external tocotransducer;per patient report;palpation -- -- mild by palpation soft by palpation --    07/12/23 0530 external tocotransducer;per patient report;palpation --  mild by palpation soft by palpation --    07/12/23 0500 external tocotransducer;per patient report;palpation 1-5 40-80 mild by palpation soft by palpation --    07/12/23 0430 external tocotransducer;per patient report;palpation 3-5 50-90 mild by palpation soft by palpation --    07/12/23 0400 external tocotransducer;per patient report;palpation 1-4 50-90 mild by palpation soft by palpation --    07/12/23 0330 external tocotransducer;per patient report;palpation 1.5-4  mild by palpation soft by palpation --    07/12/23 0300 external tocotransducer;per patient report;palpation 2-6 50-90 mild by palpation soft by palpation --    07/12/23 0230 external tocotransducer;per patient report;palpation 2-5  mild by palpation soft by palpation --    07/12/23 0200 external tocotransducer;per patient report;palpation 1.5-6  mild by palpation soft by palpation --    07/12/23 0130 external tocotransducer;per patient report;palpation -- 50-60 mild by palpation soft by palpation --    07/12/23 0100 external tocotransducer;per patient report;palpation -- 50 mild by palpation soft by palpation --    07/12/23 0030 external tocotransducer;per patient report;palpation -- 60 mild by palpation soft by palpation --    07/12/23 0000 external tocotransducer;per patient report;palpation -- 120 mild by palpation soft by palpation --    07/11/23 2330 external tocotransducer;per patient report;palpation --  mild by palpation soft by palpation --    07/11/23 2300 external tocotransducer;per patient report;palpation 2-3  mild by palpation soft by palpation --    07/11/23 2230 external tocotransducer 2-3  mild by palpation soft by palpation --    07/11/23 2200 external tocotransducer;per patient  report;palpation -- 70 mild by palpation soft by palpation --    07/11/23 2130 external tocotransducer;per patient report;palpation -- 50-60 mild by palpation soft by palpation --    07/11/23 2100 external tocotransducer;per patient report;palpation -- 60-70 mild by palpation soft by palpation --    07/11/23 2030 external tocotransducer;per patient report;palpation -- -- no contractions soft by palpation --    07/11/23 2000 external tocotransducer;per patient report;palpation x1 60 mild by palpation soft by palpation --    07/11/23 1930 external tocotransducer;per patient report;palpation --  mild by palpation soft by palpation --    07/11/23 1800 external tocotransducer -- -- no contractions soft by palpation --    07/11/23 1749 palpation;per patient report;external tocotransducer -- -- mild by palpation -- --          Labor Pain (last 2 days)       Date/Time (0-10) Pain Rating: Rest (0-10) Pain Rating: Activity Pain Management Interventions    07/13/23 0823 3 -- see MAR    07/13/23 0325 0 0 --    07/13/23 0103 1 -- --    07/13/23 0033 4 -- --    07/12/23 2357 7 -- see MAR    07/12/23 1634 1 2 see MAR    07/12/23 1107 7 -- see MAR    07/12/23 0900 7 -- see MAR    07/12/23 0737 1 2 position adjusted;quiet environment facilitated;simple massage provided    07/12/23 0350 2 2 --    07/12/23 0000 0 0 --    07/11/23 2300 2 2 --    07/11/23 2040 5 -- --    07/11/23 1746 0 0 --

## 2023-07-13 NOTE — PLAN OF CARE
Goal Outcome Evaluation:              Outcome Evaluation: VSS. Fundus firm midline U/1 w/ scant rubra. Up ad geni and voiding. Bottlefeeding. Bonding well with infant. Pain well controlled with PRN medication.      Problem: Adult Inpatient Plan of Care  Goal: Plan of Care Review  Outcome: Met  Flowsheets  Taken 7/13/2023 1825 by Nedra Neves RN  Outcome Evaluation: VSS. Fundus firm midline U/1 w/ scant rubra. Up ad geni and voiding. Bottlefeeding. Bonding well with infant. Pain well controlled with PRN medication.  Taken 7/13/2023 0641 by Rema Reynoso RN  Progress: improving  Goal: Patient-Specific Goal (Individualized)  Outcome: Met  Flowsheets  Taken 7/13/2023 0641 by Rema Reynoso RN  Individualized Care Needs: likes ice water  Taken 7/12/2023 1803 by Davina Morgan RN  Anxieties, Fears or Concerns: pain control  Goal: Absence of Hospital-Acquired Illness or Injury  Outcome: Met  Intervention: Identify and Manage Fall Risk  Recent Flowsheet Documentation  Taken 7/13/2023 1600 by Nedra Neves RN  Safety Promotion/Fall Prevention: safety round/check completed  Taken 7/13/2023 0823 by Nedra Neves RN  Safety Promotion/Fall Prevention: safety round/check completed  Intervention: Prevent and Manage VTE (Venous Thromboembolism) Risk  Recent Flowsheet Documentation  Taken 7/13/2023 1600 by Nedra Neves RN  Activity Management: up ad geni  Taken 7/13/2023 0823 by Nedra Neves RN  Activity Management: up ad geni  Intervention: Prevent Infection  Recent Flowsheet Documentation  Taken 7/13/2023 1600 by Nedra Neves RN  Infection Prevention: cohorting utilized  Taken 7/13/2023 0823 by Nedra Neves RN  Infection Prevention: cohorting utilized  Goal: Optimal Comfort and Wellbeing  Outcome: Met  Intervention: Monitor Pain and Promote Comfort  Recent Flowsheet Documentation  Taken 7/13/2023 0823 by Nedra Neves RN  Pain Management Interventions: see MAR  Intervention: Provide Person-Centered  Care  Recent Flowsheet Documentation  Taken 7/13/2023 0823 by Nedra Neves RN  Trust Relationship/Rapport:   care explained   choices provided  Goal: Readiness for Transition of Care  Outcome: Met     Problem: Adjustment to Role Transition (Postpartum Vaginal Delivery)  Goal: Successful Maternal Role Transition  Outcome: Met     Problem: Bleeding (Postpartum Vaginal Delivery)  Goal: Hemostasis  Outcome: Met     Problem: Infection (Postpartum Vaginal Delivery)  Goal: Absence of Infection Signs/Symptoms  Outcome: Met     Problem: Pain (Postpartum Vaginal Delivery)  Goal: Acceptable Pain Control  Outcome: Met  Intervention: Prevent or Manage Pain  Recent Flowsheet Documentation  Taken 7/13/2023 0823 by Nedra Neves RN  Pain Management Interventions: see MAR     Problem: Urinary Retention (Postpartum Vaginal Delivery)  Goal: Effective Urinary Elimination  Outcome: Met

## 2023-07-13 NOTE — PLAN OF CARE
Goal Outcome Evaluation:  Plan of Care Reviewed With: patient, significant other     Problem: Bleeding (Labor)  Goal: Hemostasis  Outcome: Met     Problem: Change in Fetal Wellbeing (Labor)  Goal: Stable Fetal Wellbeing  Outcome: Met  Intervention: Promote and Monitor Fetal Wellbeing  Recent Flowsheet Documentation  Taken 7/12/2023 2315 by Rema Reynoso RN  Fetal Wellbeing Promotion:   fetal heart rate monitored   uterine contraction activity assessed  Taken 7/12/2023 2245 by Rema Reynoso RN  Fetal Wellbeing Promotion:   fetal heart rate monitored   uterine contraction activity assessed  Taken 7/12/2023 2215 by Rema Reynoso RN  Fetal Wellbeing Promotion:   fetal heart rate monitored   uterine contraction activity assessed  Taken 7/12/2023 2145 by Rema Reynoso RN  Fetal Wellbeing Promotion:   fetal heart rate monitored   uterine contraction activity assessed  Taken 7/12/2023 2115 by Rema Reynoso RN  Fetal Wellbeing Promotion:   fetal heart rate monitored   uterine contraction activity assessed  Taken 7/12/2023 2045 by Rema Reynoso RN  Fetal Wellbeing Promotion:   fetal heart rate monitored   uterine contraction activity assessed  Taken 7/12/2023 2015 by Rema Reynoso RN  Fetal Wellbeing Promotion:   fetal heart rate monitored   uterine contraction activity assessed  Taken 7/12/2023 1945 by Rema Reynoso RN  Fetal Wellbeing Promotion:   fetal heart rate monitored   uterine contraction activity assessed  Taken 7/12/2023 1915 by Rema Reynoso RN  Fetal Wellbeing Promotion:   fetal heart rate monitored   uterine contraction activity assessed     Problem: Delayed Labor Progression (Labor)  Goal: Effective Progression to Delivery  Outcome: Met     Problem: Infection (Labor)  Goal: Absence of Infection Signs and Symptoms  Outcome: Met     Problem: Labor Pain (Labor)  Goal: Acceptable Pain Control  Outcome: Met     Problem: Uterine Tachysystole (Labor)  Goal: Normal Uterine  Contraction Pattern  Outcome: Met        Progress: improving  Outcome Evaluation: VSS. cook catheter placed at 1926. pt recieved cytotec x3 overnight. pt has been able to rest comfortably b/w care. jeanine irregularly. pt reports feeling some contractions, but not all of them. membranes intact. support people at bedside overnight

## 2023-07-13 NOTE — PROGRESS NOTES
"ESTEBAN Lizbet Shah  Vaginal Delivery Progress Note    Subjective   Subjective  Postpartum Day 1: Vaginal Delivery    The patient feels well.  Her pain is well controlled with nonsteroidal anti-inflammatory drugs.   She is ambulating well.  Patient describes her bleeding as thin lochia.    Breastfeeding: declines.    Objective     Objective   Vital Signs Range for the last 24 hours  Temperature: Temp:  [97.7 °F (36.5 °C)-98.3 °F (36.8 °C)] 98.2 °F (36.8 °C)   Temp Source: Temp src: Oral   BP: BP: (100-155)/(50-88) 125/78   Pulse: Heart Rate:  [] 70   Respirations: Resp:  [16-22] 16   SPO2: SpO2:  [98 %] 98 %   O2 Amount (l/min):     O2 Devices     Weight:       Admit Height:  Height: 165.1 cm (65\")    Physical Exam:  General:  no acute distresss.  Abdomen: abdomen is soft without significant tenderness, masses, organomegaly or guarding. Fundus: appropriate, firm, non tender BS+  Extremities: normal, atraumatic, no cyanosis, and trace edema.   No pain, neg Pepper's    Lab results reviewed:  Yes   Rubella:  No results found for: RUBELLAIGGIN Nurse Transcribed from prenatal record --    Rubella Antibodies, IgG   Date Value Ref Range Status   12/02/2022 <0.90 (L) Immune >0.99 index Final     Comment:                                     Non-immune       <0.90                                  Equivocal  0.90 - 0.99                                  Immune           >0.99       Rh Status:    RH type   Date Value Ref Range Status   07/11/2023 Positive  Final     Rh Factor   Date Value Ref Range Status   12/02/2022 Positive  Final     Comment:     Please note: Prior records for this patient's ABO / Rh type are not  available for additional verification.       Immunizations: There is no immunization history for the selected administration types on file for this patient.    Assessment & Plan   Assessment & Plan    Normal vaginal delivery    Prenatal care in third trimester    Rubella non-immune status, antepartum      Gela Valladares " is Day 1  post-partum    Vaginal, Spontaneous       Plan:  1) post-partum Day #1 s/p     2) post-partum care- advance as tolerated    3) Girl- bottle    4) RNI- offer MMR at discharge    5) Dispo- plan home tomorrow      Dorothy Davis, APRN  2023  08:24 EDT

## 2023-07-13 NOTE — PROGRESS NOTES
Patient: Gela L Valladares    @A@    Anesthesia Type: No value filed.  Patient location: Labor and Delivery  Last vitals  BP      Temp      Pulse     Resp      SpO2        Post vital signs: stable  Level of consciousness: awake, alert, and oriented    Post-anesthesia pain: adequate analgesia  Airway patency: patent  Respiratory: unassisted  Cardiovascular: stable and blood pressure at baseline  Hydration: euvolemic    Difficult Airway: no  Anesthetic complications: no

## 2023-07-14 VITALS
WEIGHT: 187 LBS | TEMPERATURE: 98 F | OXYGEN SATURATION: 99 % | SYSTOLIC BLOOD PRESSURE: 123 MMHG | DIASTOLIC BLOOD PRESSURE: 69 MMHG | HEIGHT: 65 IN | RESPIRATION RATE: 16 BRPM | BODY MASS INDEX: 31.16 KG/M2 | HEART RATE: 80 BPM

## 2023-07-14 PROCEDURE — 25010000002 TETANUS-DIPHTH-ACELL PERTUSSIS 5-2.5-18.5 LF-MCG/0.5 SUSPENSION PREFILLED SYRINGE: Performed by: OBSTETRICS & GYNECOLOGY

## 2023-07-14 PROCEDURE — 90707 MMR VACCINE SC: CPT | Performed by: OBSTETRICS & GYNECOLOGY

## 2023-07-14 PROCEDURE — 90715 TDAP VACCINE 7 YRS/> IM: CPT | Performed by: OBSTETRICS & GYNECOLOGY

## 2023-07-14 PROCEDURE — 25010000002 MEASLES, MUMPS & RUBELLA VAC RECONSTITUTED SOLUTION: Performed by: OBSTETRICS & GYNECOLOGY

## 2023-07-14 PROCEDURE — 90471 IMMUNIZATION ADMIN: CPT | Performed by: OBSTETRICS & GYNECOLOGY

## 2023-07-14 PROCEDURE — 90472 IMMUNIZATION ADMIN EACH ADD: CPT | Performed by: OBSTETRICS & GYNECOLOGY

## 2023-07-14 PROCEDURE — 0503F POSTPARTUM CARE VISIT: CPT | Performed by: NURSE PRACTITIONER

## 2023-07-14 RX ORDER — ACETAMINOPHEN 325 MG/1
650 TABLET ORAL EVERY 6 HOURS PRN
Qty: 30 TABLET | Refills: 0 | Status: SHIPPED | OUTPATIENT
Start: 2023-07-14

## 2023-07-14 RX ORDER — IBUPROFEN 600 MG/1
600 TABLET ORAL EVERY 6 HOURS PRN
Qty: 30 TABLET | Refills: 0 | Status: SHIPPED | OUTPATIENT
Start: 2023-07-14

## 2023-07-14 RX ORDER — PSEUDOEPHEDRINE HCL 30 MG
100 TABLET ORAL 2 TIMES DAILY
Qty: 60 CAPSULE | Refills: 0 | Status: SHIPPED | OUTPATIENT
Start: 2023-07-14

## 2023-07-14 RX ADMIN — IBUPROFEN 600 MG: 600 TABLET, FILM COATED ORAL at 05:53

## 2023-07-14 RX ADMIN — PRENATAL VIT W/ FE FUMARATE-FA TAB 27-0.8 MG 1 TABLET: 27-0.8 TAB at 08:56

## 2023-07-14 RX ADMIN — ACETAMINOPHEN 650 MG: 325 TABLET ORAL at 02:02

## 2023-07-14 RX ADMIN — FERROUS SULFATE TAB EC 324 MG (65 MG FE EQUIVALENT) 324 MG: 324 (65 FE) TABLET DELAYED RESPONSE at 08:56

## 2023-07-14 RX ADMIN — TETANUS TOXOID, REDUCED DIPHTHERIA TOXOID AND ACELLULAR PERTUSSIS VACCINE, ADSORBED 0.5 ML: 5; 2.5; 8; 8; 2.5 SUSPENSION INTRAMUSCULAR at 10:28

## 2023-07-14 RX ADMIN — DOCUSATE SODIUM 100 MG: 100 CAPSULE, LIQUID FILLED ORAL at 08:56

## 2023-07-14 RX ADMIN — ACETAMINOPHEN 650 MG: 325 TABLET ORAL at 08:56

## 2023-07-14 RX ADMIN — MEASLES, MUMPS, AND RUBELLA VIRUS VACCINE LIVE 0.5 ML: 1000; 12500; 1000 INJECTION, POWDER, LYOPHILIZED, FOR SUSPENSION SUBCUTANEOUS at 10:27

## 2023-07-14 NOTE — SIGNIFICANT NOTE
Discharge instructions reviewed with patient and SO. Tdap and MMR CDC information sheet given. Patient verbalized understanding of instructions and information sheet. Ambulated to exit without any distress.

## 2023-07-14 NOTE — CASE MANAGEMENT/SOCIAL WORK
Case Management Discharge Note      Final Note: dc home         Selected Continued Care - Discharged on 7/14/2023 Admission date: 7/11/2023 - Discharge disposition: Home or Self Care      Destination    No services have been selected for the patient.                Durable Medical Equipment    No services have been selected for the patient.                Dialysis/Infusion    No services have been selected for the patient.                Home Medical Care    No services have been selected for the patient.                Therapy    No services have been selected for the patient.                Community Resources    No services have been selected for the patient.                Community & DME    No services have been selected for the patient.                         Final Discharge Disposition Code: 01 - home or self-care

## 2023-07-14 NOTE — DISCHARGE SUMMARY
"Obstetrical Discharge Form    Primary OB Clinician: ISABEL    Gestational Age: 40.2 weeks     Antepartum complications: rubella non immune    Date of Delivery:  2023     Delivered By: Johny Kapoor [place]     Delivery Type: spontaneous vaginal delivery    Tubal Ligation: n/a    Baby: Liveborn female, Apgars 8/9, weight 6 #, 15.8 oz,     Anesthesia: epidural    Intrapartum complications: None    Laceration: vaginal    Feeding method: bottle      Discharge Date: 2023; Discharge Time: 08:22 EDT    /69 (BP Location: Right arm, Patient Position: Lying)   Pulse 80   Temp 98 °F (36.7 °C) (Oral)   Resp 16   Ht 165.1 cm (65\")   Wt 84.8 kg (187 lb)   LMP 10/03/2022 (Exact Date)   SpO2 99%   Breastfeeding Unknown   BMI 31.12 kg/m²      Abd: soft, fundus firm   Ext: Trace edema, neg Pepper's sign  Results from last 7 days   Lab Units 23  0630   HEMOGLOBIN g/dL 12.5       Impression: 1) Postpartum day #2- S/P . RH +.     2) Female infant- Bottle    3) Contraception- Undecided. Counseled on options.     4) Rubella non immune- Offer MMR postpartum     Plan:    Patient given written instruction sheet.  Follow-up appointment with [facility] in 6 weeks.    DOLLY Anderson  08:22 EDT  2023  "

## 2023-07-14 NOTE — PLAN OF CARE
Problem: Adult Inpatient Plan of Care  Goal: Plan of Care Review  Outcome: Ongoing, Progressing  Flowsheets  Taken 7/14/2023 0637  Progress: improving  Outcome Evaluation: VSS. fundus firm, bleeding scant. up ad geni. pain well controlled w/ PRN pain medications. bonding approriately w/ infant.  Taken 7/13/2023 0641  Plan of Care Reviewed With:   patient   significant other     Problem: Adult Inpatient Plan of Care  Goal: Patient-Specific Goal (Individualized)  Outcome: Ongoing, Progressing  Flowsheets (Taken 7/14/2023 0637)  Individualized Care Needs: likes to use own water cup   Goal Outcome Evaluation:  Plan of Care Reviewed With: patient, significant other        Progress: improving  Outcome Evaluation: VSS. fundus firm, bleeding scant. up ad geni. pain well controlled w/ PRN pain medications. bonding approriately w/ infant.

## 2023-07-14 NOTE — PLAN OF CARE
Problem: Adult Inpatient Plan of Care  Goal: Absence of Hospital-Acquired Illness or Injury  Intervention: Identify and Manage Fall Risk  Recent Flowsheet Documentation  Taken 7/14/2023 0856 by Margi Alfaro RN  Safety Promotion/Fall Prevention: safety round/check completed  Taken 7/14/2023 0755 by Margi Alfaro RN  Safety Promotion/Fall Prevention: safety round/check completed  Intervention: Prevent and Manage VTE (Venous Thromboembolism) Risk  Recent Flowsheet Documentation  Taken 7/14/2023 0856 by Margi Alfaro RN  Activity Management: up ad geni  Goal: Optimal Comfort and Wellbeing  Intervention: Monitor Pain and Promote Comfort  Recent Flowsheet Documentation  Taken 7/14/2023 0856 by Margi Alfaro RN  Pain Management Interventions:   quiet environment facilitated   see MAR  Intervention: Provide Person-Centered Care  Recent Flowsheet Documentation  Taken 7/14/2023 0856 by Margi Alfaro RN  Trust Relationship/Rapport:   choices provided   care explained   questions encouraged   reassurance provided   thoughts/feelings acknowledged   emotional support provided   empathic listening provided   questions answered   Goal Outcome Evaluation:

## 2023-07-24 NOTE — PROGRESS NOTES
S:  cc: Pt here for ob office visit .  hpi: Gela Valladares is a 22 y.o.  38w being seen today for her obstetrical visit.   Patient reports backache .   Fetal movement: normal. .      Social History     Social History Narrative    Not on file      SMOKER? No      O:  /82   Wt 84.7 kg (186 lb 12.8 oz)   LMP 10/03/2022 (Exact Date)   BMI 31.09 kg/m²     Prenatal Vitals  BP: 126/82  Weight: 84.7 kg (186 lb 12.8 oz)  Urine Glucose/Protein  Urine Glucose Read-only: Negative  Urine Protein Read-only: Negative    Brief Urine Lab Results  (Last result in the past 365 days)        Color   Clarity   Blood   Leuk Est   Nitrite   Protein   CREAT   Urine HCG        07/10/23 1417 Yellow   Clear   Negative   Negative   Negative   Negative                   A:    DIAGNOSES:  22 y.o.  40w2d  Diagnoses and all orders for this visit:    1. Encounter for  screening, unspecified (Primary)  -     POC Urinalysis Dipstick    2. Prenatal care in third trimester      NEW PROBLEMS? backache    P:  No follow-ups on file.    Pt instructed to call for results of any testing done today and that failure to call if she has not heard from us could result in inadequate care and treament.  Pt verbalized her understanding.   Time Spent: I spent 30+ minutes caring for Gela on this date of service. This time includes time spent by me in the following activities: preparing for the visit, reviewing tests, obtaining and/or reviewing a separately obtained history, performing a medically appropriate examination and/or evaluation, counseling and educating the patient/family/caregiver, ordering medications, tests, or procedures, referring and communicating with other health care professionals, documenting information in the medical record, independently interpreting results and communicating that information with the patient/family/caregiver, and care coordination.      Johny Vieyra MD  07:06 EDT   23

## 2024-01-02 ENCOUNTER — HOSPITAL ENCOUNTER (EMERGENCY)
Facility: HOSPITAL | Age: 24
Discharge: HOME | End: 2024-01-02
Attending: EMERGENCY MEDICINE | Admitting: EMERGENCY MEDICINE

## 2024-01-02 VITALS
BODY MASS INDEX: 28.93 KG/M2 | HEIGHT: 66 IN | OXYGEN SATURATION: 100 % | TEMPERATURE: 97.5 F | WEIGHT: 180 LBS | DIASTOLIC BLOOD PRESSURE: 65 MMHG | SYSTOLIC BLOOD PRESSURE: 127 MMHG | HEART RATE: 96 BPM | RESPIRATION RATE: 16 BRPM

## 2024-01-02 DIAGNOSIS — R11.2 NAUSEA AND VOMITING, UNSPECIFIED VOMITING TYPE: ICD-10-CM

## 2024-01-02 DIAGNOSIS — R10.10 UPPER ABDOMINAL PAIN: Primary | ICD-10-CM

## 2024-01-02 DIAGNOSIS — J40 BRONCHITIS: ICD-10-CM

## 2024-01-02 LAB
ALBUMIN SERPL-MCNC: 4.3 G/DL (ref 3.5–5.2)
ALBUMIN/GLOB SERPL: 1.4 G/DL
ALP SERPL-CCNC: 196 U/L (ref 39–117)
ALT SERPL W P-5'-P-CCNC: 283 U/L (ref 1–33)
ANION GAP SERPL CALCULATED.3IONS-SCNC: 5.6 MMOL/L (ref 5–15)
AST SERPL-CCNC: 301 U/L (ref 1–32)
B-HCG UR QL: NEGATIVE
BASOPHILS # BLD AUTO: 0.06 10*3/MM3 (ref 0–0.2)
BASOPHILS NFR BLD AUTO: 0.7 % (ref 0–1.5)
BILIRUB SERPL-MCNC: 0.5 MG/DL (ref 0–1.2)
BUN SERPL-MCNC: 12 MG/DL (ref 6–20)
BUN/CREAT SERPL: 14.5 (ref 7–25)
CALCIUM SPEC-SCNC: 9.4 MG/DL (ref 8.6–10.5)
CHLORIDE SERPL-SCNC: 102 MMOL/L (ref 98–107)
CO2 SERPL-SCNC: 28.4 MMOL/L (ref 22–29)
CREAT SERPL-MCNC: 0.83 MG/DL (ref 0.57–1)
DEPRECATED RDW RBC AUTO: 40.3 FL (ref 37–54)
EGFRCR SERPLBLD CKD-EPI 2021: 103 ML/MIN/1.73
EOSINOPHIL # BLD AUTO: 0.15 10*3/MM3 (ref 0–0.4)
EOSINOPHIL NFR BLD AUTO: 1.6 % (ref 0.3–6.2)
ERYTHROCYTE [DISTWIDTH] IN BLOOD BY AUTOMATED COUNT: 12.8 % (ref 12.3–15.4)
GLOBULIN UR ELPH-MCNC: 3.1 GM/DL
GLUCOSE SERPL-MCNC: 97 MG/DL (ref 65–99)
HCT VFR BLD AUTO: 40.3 % (ref 34–46.6)
HGB BLD-MCNC: 12.8 G/DL (ref 12–15.9)
IMM GRANULOCYTES # BLD AUTO: 0.03 10*3/MM3 (ref 0–0.05)
IMM GRANULOCYTES NFR BLD AUTO: 0.3 % (ref 0–0.5)
LIPASE SERPL-CCNC: 40 U/L (ref 13–60)
LYMPHOCYTES # BLD AUTO: 2.08 10*3/MM3 (ref 0.7–3.1)
LYMPHOCYTES NFR BLD AUTO: 22.8 % (ref 19.6–45.3)
MCH RBC QN AUTO: 27.6 PG (ref 26.6–33)
MCHC RBC AUTO-ENTMCNC: 31.8 G/DL (ref 31.5–35.7)
MCV RBC AUTO: 87 FL (ref 79–97)
MONOCYTES # BLD AUTO: 0.42 10*3/MM3 (ref 0.1–0.9)
MONOCYTES NFR BLD AUTO: 4.6 % (ref 5–12)
NEUTROPHILS NFR BLD AUTO: 6.37 10*3/MM3 (ref 1.7–7)
NEUTROPHILS NFR BLD AUTO: 70 % (ref 42.7–76)
NRBC BLD AUTO-RTO: 0 /100 WBC (ref 0–0.2)
PLATELET # BLD AUTO: 327 10*3/MM3 (ref 140–450)
PMV BLD AUTO: 10.9 FL (ref 6–12)
POTASSIUM SERPL-SCNC: 3.6 MMOL/L (ref 3.5–5.2)
PROT SERPL-MCNC: 7.4 G/DL (ref 6–8.5)
RBC # BLD AUTO: 4.63 10*6/MM3 (ref 3.77–5.28)
SODIUM SERPL-SCNC: 136 MMOL/L (ref 136–145)
WBC NRBC COR # BLD AUTO: 9.11 10*3/MM3 (ref 3.4–10.8)

## 2024-01-02 PROCEDURE — 83690 ASSAY OF LIPASE: CPT | Performed by: EMERGENCY MEDICINE

## 2024-01-02 PROCEDURE — 80053 COMPREHEN METABOLIC PANEL: CPT | Performed by: EMERGENCY MEDICINE

## 2024-01-02 PROCEDURE — 85025 COMPLETE CBC W/AUTO DIFF WBC: CPT | Performed by: EMERGENCY MEDICINE

## 2024-01-02 PROCEDURE — 99283 EMERGENCY DEPT VISIT LOW MDM: CPT

## 2024-01-02 PROCEDURE — 81025 URINE PREGNANCY TEST: CPT | Performed by: EMERGENCY MEDICINE

## 2024-01-02 PROCEDURE — 36415 COLL VENOUS BLD VENIPUNCTURE: CPT

## 2024-01-02 RX ORDER — ONDANSETRON 4 MG/1
4 TABLET, ORALLY DISINTEGRATING ORAL EVERY 8 HOURS PRN
Qty: 12 TABLET | Refills: 0 | Status: SHIPPED | OUTPATIENT
Start: 2024-01-02

## 2024-01-02 RX ORDER — DOXYCYCLINE 100 MG/1
100 CAPSULE ORAL 2 TIMES DAILY
Qty: 20 CAPSULE | Refills: 0 | Status: SHIPPED | OUTPATIENT
Start: 2024-01-02 | End: 2024-01-12

## 2024-01-02 RX ORDER — BENZONATATE 100 MG/1
200 CAPSULE ORAL 3 TIMES DAILY PRN
Qty: 15 CAPSULE | Refills: 0 | Status: SHIPPED | OUTPATIENT
Start: 2024-01-02

## 2024-01-03 ENCOUNTER — HOSPITAL ENCOUNTER (OUTPATIENT)
Dept: ULTRASOUND IMAGING | Facility: HOSPITAL | Age: 24
Discharge: HOME | End: 2024-01-03
Admitting: EMERGENCY MEDICINE

## 2024-01-03 DIAGNOSIS — R11.2 NAUSEA AND VOMITING, UNSPECIFIED VOMITING TYPE: ICD-10-CM

## 2024-01-03 PROCEDURE — 76705 ECHO EXAM OF ABDOMEN: CPT

## 2024-01-03 NOTE — ED PROVIDER NOTES
Subjective     Abdominal Pain  Pain location: epigastric abd pain, assoc/w/n/v, cough, and upper back pain few times today.      Review of Systems   Gastrointestinal:  Positive for abdominal pain.   All other systems reviewed and are negative.      Past Medical History:   Diagnosis Date    ADHD     Anemia        No Known Allergies    History reviewed. No pertinent surgical history.    History reviewed. No pertinent family history.    Social History     Socioeconomic History    Marital status: Single   Tobacco Use    Smoking status: Never    Smokeless tobacco: Never   Vaping Use    Vaping Use: Never used   Substance and Sexual Activity    Alcohol use: Never    Drug use: Never    Sexual activity: Yes     Partners: Male           Objective   Physical Exam  Vitals and nursing note reviewed.   Constitutional:       Appearance: She is well-developed. She is not ill-appearing or diaphoretic.   HENT:      Head: Normocephalic and atraumatic.   Eyes:      General: No scleral icterus.     Extraocular Movements: Extraocular movements intact.      Pupils: Pupils are equal, round, and reactive to light.   Cardiovascular:      Rate and Rhythm: Normal rate and regular rhythm.      Heart sounds: Normal heart sounds. No murmur heard.  Pulmonary:      Effort: Pulmonary effort is normal.      Breath sounds: Normal breath sounds. No wheezing, rhonchi or rales.   Abdominal:      General: Abdomen is flat.      Palpations: Abdomen is soft.      Tenderness: There is no abdominal tenderness. There is no guarding. Negative signs include Hernandes's sign and McBurney's sign.   Skin:     General: Skin is warm and dry.      Findings: No rash.   Neurological:      General: No focal deficit present.      Mental Status: She is alert.   Psychiatric:         Mood and Affect: Mood normal.         Behavior: Behavior normal.         Procedures           ED Course                                             Medical Decision Making  Amount and/or Complexity  of Data Reviewed  Labs: ordered.        Final diagnoses:   Upper abdominal pain   Bronchitis   Nausea and vomiting, unspecified vomiting type       ED Disposition  ED Disposition       ED Disposition   Discharge    Condition   Stable    Comment   --               Outpt u/s tomorrow at 12pm    In 1 day      [facility]  [address]  [place] [id number] [phone]    As needed, If symptoms worsen         Medication List        New Prescriptions      benzonatate 100 MG capsule  Commonly known as: TESSALON  Take 2 capsules by mouth 3 (Three) Times a Day As Needed for Cough.     doxycycline 100 MG capsule  Commonly known as: MONODOX  Take 1 capsule by mouth 2 (Two) Times a Day for 10 days.     ondansetron ODT 4 MG disintegrating tablet  Commonly known as: ZOFRAN-ODT  Place 1 tablet on the tongue Every 8 (Eight) Hours As Needed for Nausea or Vomiting.               Where to Get Your Medications        These medications were [facility] to [id number] [place] [facility] [address] [facility] [address] - [phone]  - [phone] FX  [address] [address]      Phone: [phone]   benzonatate 100 MG capsule  doxycycline 100 MG capsule  ondansetron ODT 4 MG disintegrating tablet            Rory Mendez MD  01/02/24 7608

## 2024-01-10 ENCOUNTER — OFFICE VISIT (OUTPATIENT)
Dept: SURGERY | Facility: CLINIC | Age: 24
End: 2024-01-10

## 2024-01-10 VITALS
DIASTOLIC BLOOD PRESSURE: 68 MMHG | SYSTOLIC BLOOD PRESSURE: 108 MMHG | HEIGHT: 66 IN | BODY MASS INDEX: 30.15 KG/M2 | WEIGHT: 187.6 LBS

## 2024-01-10 DIAGNOSIS — K80.11 CALCULUS OF GALLBLADDER WITH CHRONIC CHOLECYSTITIS WITH OBSTRUCTION: Primary | ICD-10-CM

## 2024-01-10 RX ORDER — IBUPROFEN 200 MG
200 TABLET ORAL EVERY 6 HOURS PRN
COMMUNITY

## 2024-01-10 NOTE — PROGRESS NOTES
Colorectal & General Surgery  History & Physical    Patient: Gela Valladares  YOB: 2000  MRN: [id number]      Assessment  Gela Valladares is a 23 y.o. female who had a recent episode of calculus cholecystitis and likely choledocholithiasis given her increased common bile duct diameter on ultrasound and elevated liver enzymes.  We discussed the etiology of cholelithiasis and its progression of choledocholithiasis as well as the risk of recurrence.  While she is currently asymptomatic, I suspect that she will likely have further episodes in the future.  We discussed the role of laparoscopic cholecystectomy with intraoperative cholangiogram.  We discussed the risk, benefits, and alternatives to surgery, including the risk of bile duct injury and the need for further procedures for choledocholithiasis.  She wishes to proceed to the operating room.    Chief Complaint: Right upper quadrant abdominal pain last week    History of Present Illness   Gela Valladares is a 23 y.o. female who presented to the emergency department here at [facility] last week with right upper quadrant abdominal pain.  It was sudden onset and radiated to her back.  It was associated with nausea and emesis.  She had never had pain like this before and since that episode has felt fine.  She has modified her diet to prevent further episodes.  She has no prior history of abdominal surgery.    Past Medical History   Past Medical History:   Diagnosis Date    ADHD     Anemia         Past Surgical History   History reviewed. No pertinent surgical history.    Social History  Social History     Socioeconomic History    Marital status: Single   Tobacco Use    Smoking status: Never    Smokeless tobacco: Never   Vaping Use    Vaping Use: Never used   Substance and Sexual Activity    Alcohol use: Never    Drug use: Never    Sexual activity: Yes     Partners: Male       Family History  Family History   Problem Relation Age of Onset    Gallbladder disease  Paternal Grandmother     Gallbladder disease Paternal Aunt     Gallbladder disease Cousin     Gallbladder disease Cousin        Colorectal cancer family history: None    Review of Systems  Negative except as documented in the HPI.     Allergies  No Known Allergies    Medications    Current Outpatient Medications:     benzonatate (TESSALON) 100 MG capsule, Take 2 capsules by mouth 3 (Three) Times a Day As Needed for Cough., Disp: 15 capsule, Rfl: 0    doxycycline (MONODOX) 100 MG capsule, Take 1 capsule by mouth 2 (Two) Times a Day for 10 days., Disp: 20 capsule, Rfl: 0    ibuprofen (ADVIL,MOTRIN) 200 MG tablet, Take 1 tablet by mouth Every 6 (Six) Hours As Needed for Mild Pain., Disp: , Rfl:     ondansetron ODT (ZOFRAN-ODT) 4 MG disintegrating tablet, Place 1 tablet on the tongue Every 8 (Eight) Hours As Needed for Nausea or Vomiting., Disp: 12 tablet, Rfl: 0    Vital Signs  Vitals:    01/10/24 1008   BP: 108/68        Physical Exam  Constitutional: Resting comfortably, no acute distress  Neck: Supple, trachea midline  Respiratory: No increased work of breathing, Symmetric excursion  Cardiovascular: Well pefursed, no jugular venous distention evident   Abdominal:  Soft, non-tender, non-distended  Lymphatics: No cervical or suprascapular adenopathy  Skin: Warm, dry, no rash on visualized skin surfaces  Musculoskeletal: Symmetric strength, no obvious gross abnormalities  Psychiatric: Alert and oriented ×3, normal affect      Laboratory Results  I have personally reviewed CBC with WBC 9, hemoglobin 12, platelets 327.  Lipase 40.  CMP with creatinine 0.83, AST with 301, , total bilirubin 0.5, alkaline phosphatase 196, albumin 4.3.    Radiology  I have personally reviewed ultrasound the gallbladder demonstrates cholelithiasis and gallbladder distention.  Common bile duct measures 8 mm.  No radiographic evidence of choledocholithiasis.    Endoscopy  None to review         Nacho Landis MD  Colorectal & General  Surgery  [facility]    [address], [address]  [place], [place], [address]  P: [phone]  F: [phone]

## 2024-01-10 NOTE — H&P (VIEW-ONLY)
Colorectal & General Surgery  History & Physical    Patient: Gela Valladares  YOB: 2000  MRN: [id number]      Assessment  Gela Valladares is a 23 y.o. female who had a recent episode of calculus cholecystitis and likely choledocholithiasis given her increased common bile duct diameter on ultrasound and elevated liver enzymes.  We discussed the etiology of cholelithiasis and its progression of choledocholithiasis as well as the risk of recurrence.  While she is currently asymptomatic, I suspect that she will likely have further episodes in the future.  We discussed the role of laparoscopic cholecystectomy with intraoperative cholangiogram.  We discussed the risk, benefits, and alternatives to surgery, including the risk of bile duct injury and the need for further procedures for choledocholithiasis.  She wishes to proceed to the operating room.    Chief Complaint: Right upper quadrant abdominal pain last week    History of Present Illness   Gela Valladares is a 23 y.o. female who presented to the emergency department here at [facility] last week with right upper quadrant abdominal pain.  It was sudden onset and radiated to her back.  It was associated with nausea and emesis.  She had never had pain like this before and since that episode has felt fine.  She has modified her diet to prevent further episodes.  She has no prior history of abdominal surgery.    Past Medical History   Past Medical History:   Diagnosis Date    ADHD     Anemia         Past Surgical History   History reviewed. No pertinent surgical history.    Social History  Social History     Socioeconomic History    Marital status: Single   Tobacco Use    Smoking status: Never    Smokeless tobacco: Never   Vaping Use    Vaping Use: Never used   Substance and Sexual Activity    Alcohol use: Never    Drug use: Never    Sexual activity: Yes     Partners: Male       Family History  Family History   Problem Relation Age of Onset    Gallbladder disease  Paternal Grandmother     Gallbladder disease Paternal Aunt     Gallbladder disease Cousin     Gallbladder disease Cousin        Colorectal cancer family history: None    Review of Systems  Negative except as documented in the HPI.     Allergies  No Known Allergies    Medications    Current Outpatient Medications:     benzonatate (TESSALON) 100 MG capsule, Take 2 capsules by mouth 3 (Three) Times a Day As Needed for Cough., Disp: 15 capsule, Rfl: 0    doxycycline (MONODOX) 100 MG capsule, Take 1 capsule by mouth 2 (Two) Times a Day for 10 days., Disp: 20 capsule, Rfl: 0    ibuprofen (ADVIL,MOTRIN) 200 MG tablet, Take 1 tablet by mouth Every 6 (Six) Hours As Needed for Mild Pain., Disp: , Rfl:     ondansetron ODT (ZOFRAN-ODT) 4 MG disintegrating tablet, Place 1 tablet on the tongue Every 8 (Eight) Hours As Needed for Nausea or Vomiting., Disp: 12 tablet, Rfl: 0    Vital Signs  Vitals:    01/10/24 1008   BP: 108/68        Physical Exam  Constitutional: Resting comfortably, no acute distress  Neck: Supple, trachea midline  Respiratory: No increased work of breathing, Symmetric excursion  Cardiovascular: Well pefursed, no jugular venous distention evident   Abdominal:  Soft, non-tender, non-distended  Lymphatics: No cervical or suprascapular adenopathy  Skin: Warm, dry, no rash on visualized skin surfaces  Musculoskeletal: Symmetric strength, no obvious gross abnormalities  Psychiatric: Alert and oriented ×3, normal affect      Laboratory Results  I have personally reviewed CBC with WBC 9, hemoglobin 12, platelets 327.  Lipase 40.  CMP with creatinine 0.83, AST with 301, , total bilirubin 0.5, alkaline phosphatase 196, albumin 4.3.    Radiology  I have personally reviewed ultrasound the gallbladder demonstrates cholelithiasis and gallbladder distention.  Common bile duct measures 8 mm.  No radiographic evidence of choledocholithiasis.    Endoscopy  None to review         Nacho Landis MD  Colorectal & General  Surgery  [facility]    [address], [address]  [place], [place], [address]  P: [phone]  F: [phone]

## 2024-01-11 NOTE — PRE-PROCEDURE INSTRUCTIONS
History reviewed by phone w/patient. Preop instructions reviewed and instructed clears until 8:30 am dos, voiced understanding.

## 2024-01-12 ENCOUNTER — PATIENT ROUNDING (BHMG ONLY) (OUTPATIENT)
Dept: SURGERY | Facility: CLINIC | Age: 24
End: 2024-01-12

## 2024-01-12 NOTE — PROGRESS NOTES
January 12, 2024      I am calling to officially welcome you to our practice and ask about your recent visit. Is this a good time to talk? No. Left voicemail to call back.

## 2024-01-16 ENCOUNTER — ANESTHESIA EVENT (OUTPATIENT)
Dept: PERIOP | Facility: HOSPITAL | Age: 24
End: 2024-01-16
Payer: MEDICAID

## 2024-01-17 ENCOUNTER — HOSPITAL ENCOUNTER (OUTPATIENT)
Facility: HOSPITAL | Age: 24
Setting detail: HOSPITAL OUTPATIENT SURGERY
Discharge: HOME | End: 2024-01-17
Attending: SURGERY | Admitting: NURSE ANESTHETIST, CERTIFIED REGISTERED
Payer: MEDICAID

## 2024-01-17 ENCOUNTER — APPOINTMENT (OUTPATIENT)
Dept: GENERAL RADIOLOGY | Facility: HOSPITAL | Age: 24
End: 2024-01-17
Payer: MEDICAID

## 2024-01-17 ENCOUNTER — ANESTHESIA (OUTPATIENT)
Dept: PERIOP | Facility: HOSPITAL | Age: 24
End: 2024-01-17
Payer: MEDICAID

## 2024-01-17 VITALS
RESPIRATION RATE: 24 BRPM | DIASTOLIC BLOOD PRESSURE: 49 MMHG | TEMPERATURE: 97.8 F | HEIGHT: 66 IN | HEART RATE: 58 BPM | BODY MASS INDEX: 30.44 KG/M2 | WEIGHT: 189.4 LBS | SYSTOLIC BLOOD PRESSURE: 102 MMHG | OXYGEN SATURATION: 97 %

## 2024-01-17 DIAGNOSIS — K80.11 CALCULUS OF GALLBLADDER WITH CHRONIC CHOLECYSTITIS WITH OBSTRUCTION: ICD-10-CM

## 2024-01-17 LAB — HCG SERPL QL: NEGATIVE

## 2024-01-17 PROCEDURE — 25810000003 LACTATED RINGERS PER 1000 ML: Performed by: NURSE ANESTHETIST, CERTIFIED REGISTERED

## 2024-01-17 PROCEDURE — 25010000002 ONDANSETRON PER 1 MG: Performed by: NURSE ANESTHETIST, CERTIFIED REGISTERED

## 2024-01-17 PROCEDURE — 25010000002 SUGAMMADEX 200 MG/2ML SOLUTION: Performed by: NURSE ANESTHETIST, CERTIFIED REGISTERED

## 2024-01-17 PROCEDURE — 25010000002 DEXAMETHASONE PER 1 MG: Performed by: NURSE ANESTHETIST, CERTIFIED REGISTERED

## 2024-01-17 PROCEDURE — 25010000002 PROPOFOL 200 MG/20ML EMULSION: Performed by: NURSE ANESTHETIST, CERTIFIED REGISTERED

## 2024-01-17 PROCEDURE — 25010000002 MIDAZOLAM PER 1MG: Performed by: NURSE ANESTHETIST, CERTIFIED REGISTERED

## 2024-01-17 PROCEDURE — 25010000002 CEFAZOLIN PER 500 MG: Performed by: SURGERY

## 2024-01-17 PROCEDURE — 84703 CHORIONIC GONADOTROPIN ASSAY: CPT | Performed by: SURGERY

## 2024-01-17 PROCEDURE — 47562 LAPAROSCOPIC CHOLECYSTECTOMY: CPT | Performed by: SURGERY

## 2024-01-17 PROCEDURE — 25010000002 FENTANYL CITRATE (PF) 50 MCG/ML SOLUTION: Performed by: NURSE ANESTHETIST, CERTIFIED REGISTERED

## 2024-01-17 PROCEDURE — 25010000002 HYDROMORPHONE 1 MG/ML SOLUTION: Performed by: NURSE ANESTHETIST, CERTIFIED REGISTERED

## 2024-01-17 PROCEDURE — 88304 TISSUE EXAM BY PATHOLOGIST: CPT | Performed by: SURGERY

## 2024-01-17 DEVICE — HORIZON TI ML 6 CLIPS/CART
Type: IMPLANTABLE DEVICE | Site: ABDOMEN | Status: FUNCTIONAL
Brand: WECK

## 2024-01-17 RX ORDER — OXYCODONE HYDROCHLORIDE AND ACETAMINOPHEN 5; 325 MG/1; MG/1
1 TABLET ORAL ONCE AS NEEDED
Status: DISCONTINUED | OUTPATIENT
Start: 2024-01-17 | End: 2024-01-17 | Stop reason: HOSPADM

## 2024-01-17 RX ORDER — ONDANSETRON 2 MG/ML
4 INJECTION INTRAMUSCULAR; INTRAVENOUS ONCE AS NEEDED
Status: DISCONTINUED | OUTPATIENT
Start: 2024-01-17 | End: 2024-01-17 | Stop reason: HOSPADM

## 2024-01-17 RX ORDER — FAMOTIDINE 10 MG/ML
20 INJECTION, SOLUTION INTRAVENOUS
Status: COMPLETED | OUTPATIENT
Start: 2024-01-17 | End: 2024-01-17

## 2024-01-17 RX ORDER — BUPIVACAINE HYDROCHLORIDE AND EPINEPHRINE 5; 5 MG/ML; UG/ML
INJECTION, SOLUTION EPIDURAL; INTRACAUDAL; PERINEURAL AS NEEDED
Status: DISCONTINUED | OUTPATIENT
Start: 2024-01-17 | End: 2024-01-17 | Stop reason: HOSPADM

## 2024-01-17 RX ORDER — ROCURONIUM BROMIDE 10 MG/ML
INJECTION, SOLUTION INTRAVENOUS AS NEEDED
Status: DISCONTINUED | OUTPATIENT
Start: 2024-01-17 | End: 2024-01-17 | Stop reason: SURG

## 2024-01-17 RX ORDER — MIDAZOLAM HYDROCHLORIDE 2 MG/2ML
1 INJECTION, SOLUTION INTRAMUSCULAR; INTRAVENOUS
Status: DISCONTINUED | OUTPATIENT
Start: 2024-01-17 | End: 2024-01-17 | Stop reason: HOSPADM

## 2024-01-17 RX ORDER — SODIUM CHLORIDE 0.9 % (FLUSH) 0.9 %
10 SYRINGE (ML) INJECTION EVERY 12 HOURS SCHEDULED
Status: DISCONTINUED | OUTPATIENT
Start: 2024-01-17 | End: 2024-01-17 | Stop reason: HOSPADM

## 2024-01-17 RX ORDER — ONDANSETRON 2 MG/ML
4 INJECTION INTRAMUSCULAR; INTRAVENOUS ONCE AS NEEDED
Status: COMPLETED | OUTPATIENT
Start: 2024-01-17 | End: 2024-01-17

## 2024-01-17 RX ORDER — PROPOFOL 10 MG/ML
INJECTION, EMULSION INTRAVENOUS AS NEEDED
Status: DISCONTINUED | OUTPATIENT
Start: 2024-01-17 | End: 2024-01-17 | Stop reason: SURG

## 2024-01-17 RX ORDER — MAGNESIUM HYDROXIDE 1200 MG/15ML
LIQUID ORAL AS NEEDED
Status: DISCONTINUED | OUTPATIENT
Start: 2024-01-17 | End: 2024-01-17 | Stop reason: HOSPADM

## 2024-01-17 RX ORDER — DEXMEDETOMIDINE HYDROCHLORIDE 100 UG/ML
INJECTION, SOLUTION INTRAVENOUS AS NEEDED
Status: DISCONTINUED | OUTPATIENT
Start: 2024-01-17 | End: 2024-01-17 | Stop reason: SURG

## 2024-01-17 RX ORDER — FENTANYL CITRATE 50 UG/ML
50 INJECTION, SOLUTION INTRAMUSCULAR; INTRAVENOUS
Status: DISCONTINUED | OUTPATIENT
Start: 2024-01-17 | End: 2024-01-17 | Stop reason: HOSPADM

## 2024-01-17 RX ORDER — FENTANYL CITRATE 50 UG/ML
INJECTION, SOLUTION INTRAMUSCULAR; INTRAVENOUS AS NEEDED
Status: DISCONTINUED | OUTPATIENT
Start: 2024-01-17 | End: 2024-01-17 | Stop reason: SURG

## 2024-01-17 RX ORDER — SODIUM CHLORIDE 0.9 % (FLUSH) 0.9 %
10 SYRINGE (ML) INJECTION AS NEEDED
Status: DISCONTINUED | OUTPATIENT
Start: 2024-01-17 | End: 2024-01-17 | Stop reason: HOSPADM

## 2024-01-17 RX ORDER — SODIUM CHLORIDE, SODIUM LACTATE, POTASSIUM CHLORIDE, CALCIUM CHLORIDE 600; 310; 30; 20 MG/100ML; MG/100ML; MG/100ML; MG/100ML
9 INJECTION, SOLUTION INTRAVENOUS CONTINUOUS PRN
Status: DISCONTINUED | OUTPATIENT
Start: 2024-01-17 | End: 2024-01-17 | Stop reason: HOSPADM

## 2024-01-17 RX ORDER — ACETAMINOPHEN 325 MG/1
650 TABLET ORAL EVERY 4 HOURS PRN
Start: 2024-01-17 | End: 2025-01-16

## 2024-01-17 RX ORDER — SODIUM CHLORIDE 9 MG/ML
INJECTION, SOLUTION INTRAVENOUS AS NEEDED
Status: DISCONTINUED | OUTPATIENT
Start: 2024-01-17 | End: 2024-01-17 | Stop reason: HOSPADM

## 2024-01-17 RX ORDER — DEXAMETHASONE SODIUM PHOSPHATE 4 MG/ML
4 INJECTION, SOLUTION INTRA-ARTICULAR; INTRALESIONAL; INTRAMUSCULAR; INTRAVENOUS; SOFT TISSUE ONCE AS NEEDED
Status: COMPLETED | OUTPATIENT
Start: 2024-01-17 | End: 2024-01-17

## 2024-01-17 RX ORDER — OXYCODONE HYDROCHLORIDE 5 MG/1
5 TABLET ORAL EVERY 4 HOURS PRN
Qty: 10 TABLET | Refills: 0 | Status: SHIPPED | OUTPATIENT
Start: 2024-01-17 | End: 2024-01-24

## 2024-01-17 RX ORDER — LIDOCAINE HYDROCHLORIDE 20 MG/ML
INJECTION, SOLUTION INFILTRATION; PERINEURAL AS NEEDED
Status: DISCONTINUED | OUTPATIENT
Start: 2024-01-17 | End: 2024-01-17 | Stop reason: SURG

## 2024-01-17 RX ORDER — SODIUM CHLORIDE 9 MG/ML
40 INJECTION, SOLUTION INTRAVENOUS AS NEEDED
Status: DISCONTINUED | OUTPATIENT
Start: 2024-01-17 | End: 2024-01-17 | Stop reason: HOSPADM

## 2024-01-17 RX ADMIN — DEXMEDETOMIDINE HYDROCHLORIDE 8 MCG: 100 INJECTION, SOLUTION INTRAVENOUS at 13:05

## 2024-01-17 RX ADMIN — SUGAMMADEX 200 MG: 100 INJECTION, SOLUTION INTRAVENOUS at 12:58

## 2024-01-17 RX ADMIN — SODIUM CHLORIDE, POTASSIUM CHLORIDE, SODIUM LACTATE AND CALCIUM CHLORIDE 9 ML/HR: 600; 310; 30; 20 INJECTION, SOLUTION INTRAVENOUS at 11:29

## 2024-01-17 RX ADMIN — CEFAZOLIN 2000 MG: 2 INJECTION, POWDER, FOR SOLUTION INTRAVENOUS at 12:04

## 2024-01-17 RX ADMIN — DEXMEDETOMIDINE HYDROCHLORIDE 20 MCG: 100 INJECTION, SOLUTION INTRAVENOUS at 12:03

## 2024-01-17 RX ADMIN — FAMOTIDINE 20 MG: 10 INJECTION, SOLUTION INTRAVENOUS at 11:29

## 2024-01-17 RX ADMIN — MIDAZOLAM HYDROCHLORIDE 1 MG: 1 INJECTION, SOLUTION INTRAMUSCULAR; INTRAVENOUS at 11:56

## 2024-01-17 RX ADMIN — LIDOCAINE HYDROCHLORIDE 100 MG: 20 INJECTION, SOLUTION INFILTRATION; PERINEURAL at 12:03

## 2024-01-17 RX ADMIN — FENTANYL CITRATE 50 MCG: 50 INJECTION, SOLUTION INTRAMUSCULAR; INTRAVENOUS at 12:03

## 2024-01-17 RX ADMIN — HYDROMORPHONE HYDROCHLORIDE 0.25 MG: 1 INJECTION, SOLUTION INTRAMUSCULAR; INTRAVENOUS; SUBCUTANEOUS at 13:46

## 2024-01-17 RX ADMIN — PROPOFOL 150 MG: 10 INJECTION, EMULSION INTRAVENOUS at 12:03

## 2024-01-17 RX ADMIN — DEXAMETHASONE SODIUM PHOSPHATE 4 MG: 4 INJECTION, SOLUTION INTRAMUSCULAR; INTRAVENOUS at 11:28

## 2024-01-17 RX ADMIN — ONDANSETRON 4 MG: 2 INJECTION INTRAMUSCULAR; INTRAVENOUS at 11:28

## 2024-01-17 RX ADMIN — ROCURONIUM BROMIDE 50 MG: 10 INJECTION, SOLUTION INTRAVENOUS at 12:03

## 2024-01-17 NOTE — ANESTHESIA POSTPROCEDURE EVALUATION
Patient: Gela Valladares    Procedure Summary       Date: 01/17/24 Room / Location:  [facility] OR 2 /  [facility]    Anesthesia Start: 1158 Anesthesia Stop: 1316    Procedure: CHOLECYSTECTOMY LAPAROSCOPIC (Abdomen) Diagnosis:       Calculus of gallbladder with chronic cholecystitis with obstruction      (Calculus of gallbladder with chronic cholecystitis with obstruction [K80.11])    Surgeons: Juan Landis MD Provider: Shanice Bee CRNA    Anesthesia Type: general ASA Status: 2            Anesthesia Type: general    Vitals  Vitals Value Taken Time   /55 01/17/24 1355   Temp 98.6 °F (37 °C) 01/17/24 1317   Pulse 76 01/17/24 1356   Resp 22 01/17/24 1355   SpO2 98 % 01/17/24 1357   Vitals shown include unfiled device data.        Post Anesthesia Care and Evaluation    Patient location during evaluation: PHASE II  Patient participation: complete - patient participated  Level of consciousness: awake and alert  Pain score: 3  Pain management: adequate    Airway patency: patent  Anesthetic complications: No anesthetic complications  PONV Status: none  Cardiovascular status: acceptable  Respiratory status: acceptable  Hydration status: acceptable

## 2024-01-17 NOTE — ANESTHESIA PREPROCEDURE EVALUATION
Anesthesia Evaluation     Patient summary reviewed and Nursing notes reviewed   no history of anesthetic complications:   NPO Solid Status: > 8 hours  NPO Liquid Status: > 2 hours           Airway   Mallampati: II  TM distance: >3 FB  Neck ROM: full  No difficulty expected  Dental          Pulmonary - negative pulmonary ROS    breath sounds clear to auscultation  Cardiovascular - negative cardio ROS  Exercise tolerance: good (4-7 METS)    Rhythm: regular  Rate: normal        Neuro/Psych  (+) psychiatric history ADHD  GI/Hepatic/Renal/Endo - negative ROS   (+) obesity    Musculoskeletal (-) negative ROS    Abdominal   (+) obese   Substance History - negative use     OB/GYN negative ob/gyn ROS         Other - negative ROS                     Anesthesia Plan    ASA 2     general     intravenous induction     Anesthetic plan, risks, benefits, and alternatives have been provided, discussed and informed consent has been obtained with: patient.    Use of blood products discussed with patient  Consented to blood products.      CODE STATUS:

## 2024-01-17 NOTE — OP NOTE
Colorectal & General Surgery  Operative Report    Patient: Gela Valladares  YOB: 2000  MRN: [id number]  DATE OF PROCEDURE: 01/17/24     PREOPERATIVE DIAGNOSIS:  Calculus cholecystitis    POSTOPERATIVE DIAGNOSIS:  Same    PROCEDURE:  Laparoscopic cholecystectomy    FINDINGS:  Critical view of safety achieved.  Clips in adequate position with no leakage of bile at conclusion of case.    SURGEON:  Nacho Landis MD    ASSISTANT:  Assistant: Pablo Lewis CSA was responsible for performing the following activities: Retraction, Suturing, Closing, Placing Dressing, and Held/Positioned Camera and their skilled assistance was necessary for the success of this case.      ANESTHESIA:  General endotracheal    EBL:  50mL    SPECIMEN:  Gallbladder    OPERATIVE DESCRIPTION:  The patient was brought to the operating room under the care of the nursing staff.  The patient was placed on the operating room table in the supine position where anesthesia was induced.  The patient was then prepped and positioned in the usual sterile fashion.  A standardized timeout was then performed.    The Veress needle was inserted into the right upper quadrant atraumatically.  The abdomen was insufflated to 15 mmHg.  Local anesthetic was infiltrated into all incision sites.  A 5 mm trocar was placed at the umbilicus.  3 additional trocars were placed in the right upper quadrant, the subxiphoid port being 10 mm.  The Veress needle site was inspected.  The gallbladder was identified and attachments to the omentum and duodenum were taken down sharply.  The peritoneum on either side of the gallbladder was incised.  Combination of blunt and sharp dissection was utilized to identify the cystic duct and cystic artery.  Critical view of safety was achieved with 2 and only 2 structures entering the gallbladder.  A clip was placed distally on the cystic duct and a ductotomy was created.  I then attempted to insert the cholangiogram catheter,  but there was a kink in the cystic duct that precluded passage of the catheter into the duct far enough to allow clipping.  Cholangiogram was subsequently aborted.  The cystic duct had 2 additional clips placed proximally and was divided.  The cystic artery was then clipped, with 1 clip distally and 2 clips proximally.  It was also divided.  The gallbladder was then removed from the liver with the Bovie electrocautery.  The gallbladder was placed in an Endo Catch bag.  Hemostasis was verified.  Clip placement was verified.  The gallbladder was then removed through the subxiphoid port, which was closed with 0 Vicryl suture.  The remaining trocars were removed and the abdomen was desufflated.  All incisions were copiously irrigated.  The incisions were closed with 3-0 Monocryl suture and Exofin.    All needle, sponge, and instrument counts were correct at the end of the case.    The patient tolerated the procedure well and was transferred to the postanesthesia care unit in stable condition.    Nacho Landis M.D.  Colorectal & General Surgery  [facility]    [address] [place] [address], [address]  [place], [place], [address]  P: [phone]  F: [phone]

## 2024-01-17 NOTE — INTERVAL H&P NOTE
H&P reviewed. The patient was examined and there are no changes to the H&P.      Vitals:    01/17/24 1100   BP: 135/79   Pulse: 98   Resp: 20   Temp:    SpO2: 100%

## 2024-01-17 NOTE — DISCHARGE INSTRUCTIONS
POST OP RECOMMENDATIONS  Dr. Nacho Landis  [phone]  Discharge Instructions    Activity  You should get up and move about several times daily to reduce the risk of developing a blood clot in your legs.   No lifting more than 10 pounds for 6 weeks  Diet  As toelrated  Dressings  The glue on your incisions will fall off on its own in 1-2 weeks.  You do not need to pack any of your incisions  Bathing  It's ok to shower anytime.   Do not submerge your incisions (bath, pool, lake, ocean, etc.) for 3 weeks.  You can wash your incisions with warm soapy water very gently and pat dry  Pain Management  Unless you have been told otherwise, it's ok to take Tylenol 1000 mg every 6 hours as needed.  I have provided you a prescription for a narcotic pain medication. Take this as needed.   Please call the office if you have intense pain that is not relieved.   Blood clot prevention  You should be up walking multiple times each day to prevent blood clots from forming.   If I sent you home on a low-dose blood thinner shot, please take those every day until you run out.   Driving  Do not drive while taking narcotic pain medications.   Before driving, make sure that you are able to move and rotate appropriately to keep you and the rest of us safe on the road.   Follow up appointment  My office will call you with a follow up appointment if you do not have one already. It will be in 2-3 weeks.   If you do not hear from my office in 1 week, please call [phone] to ask about scheduling.   Remember to contact me for any of the following:   Fever > 101 degrees  Severe pain that cannot be controlled by taking your pain pills  Severe nausea or vomiting that cannot be controlled by taking your nausea pills  Significant bleeding of your incisions  Drainage that has a bad smell or is yellow or green in appearance  Any other questions or concerns

## 2024-01-17 NOTE — ANESTHESIA PROCEDURE NOTES
Airway  Urgency: elective    Date/Time: 1/17/2024 12:03 PM  Airway not difficult    General Information and Staff    Patient location during procedure: OR  CRNA/CAA: Miguel Eli CRNA    Indications and Patient Condition  Indications for airway management: airway protection    Preoxygenated: yes  Mask difficulty assessment: 1 - vent by mask    Final Airway Details  Final airway type: endotracheal airway      Successful airway: ETT  Cuffed: yes   Successful intubation technique: direct laryngoscopy  Endotracheal tube insertion site: oral  Blade: Lucas  Blade size: 2  ETT size (mm): 7.0  Cormack-Lehane Classification: grade I - full view of glottis  Placement verified by: chest auscultation and capnometry   Cuff volume (mL): 10  Measured from: lips  ETT/EBT  to lips (cm): 22  Number of attempts at approach: 1  Assessment: lips, teeth, and gum same as pre-op and atraumatic intubation

## 2024-01-19 LAB
LAB AP CASE REPORT: NORMAL
PATH REPORT.FINAL DX SPEC: NORMAL
PATH REPORT.GROSS SPEC: NORMAL

## 2024-01-24 ENCOUNTER — OFFICE VISIT (OUTPATIENT)
Dept: SURGERY | Facility: CLINIC | Age: 24
End: 2024-01-24
Payer: MEDICAID

## 2024-01-24 VITALS
HEIGHT: 66 IN | DIASTOLIC BLOOD PRESSURE: 80 MMHG | WEIGHT: 188.6 LBS | SYSTOLIC BLOOD PRESSURE: 114 MMHG | BODY MASS INDEX: 30.31 KG/M2

## 2024-01-24 DIAGNOSIS — K80.11 CALCULUS OF GALLBLADDER WITH CHRONIC CHOLECYSTITIS WITH OBSTRUCTION: Primary | ICD-10-CM

## 2024-01-24 PROCEDURE — 1159F MED LIST DOCD IN RCRD: CPT | Performed by: SURGERY

## 2024-01-24 PROCEDURE — 1160F RVW MEDS BY RX/DR IN RCRD: CPT | Performed by: SURGERY

## 2024-01-24 PROCEDURE — 99024 POSTOP FOLLOW-UP VISIT: CPT | Performed by: SURGERY

## 2024-01-24 NOTE — PROGRESS NOTES
Colorectal & General Surgery  Post - Op    Patient: Gela Valladares  YOB: 2000  MRN: [id number]      Assessment  Gela Valladares is a 23 y.o. female with recent episode of acute cholecystitis now status post laparoscopic cholecystectomy.  She is recovered well from the operation.  Reviewed her pathology with her today.  I reinforced her lifting restrictions.  All questions were answered.  She is welcome to follow-up with me on an as-needed basis.    History of Present Illness   Gela Valladares is a 23 y.o. female with recent episode of acute cholecystitis now status post laparoscopic cholecystectomy.  She is recovering quite well.  She is tolerating regular diet denies diarrhea.  Her pain is well-controlled without the use of narcotic pain medications.    Vital Signs  Vitals:    01/24/24 1132   BP: 114/80        Physical Exam  Constitutional: Resting comfortably, no acute distress  Neck: Supple, trachea midline  Respiratory: No increased work of breathing, Symmetric excursion  Cardiovascular: Well pefursed, no jugular venous distention evident   Abdominal: Incisions healing well soft, non-tender, non-distended  Lymphatics: No cervical or suprascapular adenopathy  Skin: Warm, dry, no rash on visualized skin surfaces  Musculoskeletal: Symmetric strength, no obvious gross abnormalities  Psychiatric: Alert and oriented ×3, normal affect       Pathology  I have personally reviewed pathology results with the patient demonstrating chronic calculus cholecystitis    Nacho Landis MD  Colorectal & General Surgery  [facility]    [address], [address]  [place], [place], [address]  P: [phone]  F: [phone]

## 2025-01-28 ENCOUNTER — HOSPITAL ENCOUNTER (EMERGENCY)
Facility: HOSPITAL | Age: 25
Discharge: HOME | End: 2025-01-29
Attending: EMERGENCY MEDICINE | Admitting: EMERGENCY MEDICINE
Payer: MEDICAID

## 2025-01-28 ENCOUNTER — APPOINTMENT (OUTPATIENT)
Dept: GENERAL RADIOLOGY | Facility: HOSPITAL | Age: 25
End: 2025-01-28
Payer: MEDICAID

## 2025-01-28 VITALS
HEART RATE: 91 BPM | TEMPERATURE: 98 F | WEIGHT: 209 LBS | HEIGHT: 66 IN | DIASTOLIC BLOOD PRESSURE: 91 MMHG | SYSTOLIC BLOOD PRESSURE: 133 MMHG | OXYGEN SATURATION: 98 % | BODY MASS INDEX: 33.59 KG/M2 | RESPIRATION RATE: 18 BRPM

## 2025-01-28 DIAGNOSIS — S93.492A SPRAIN OF POSTERIOR TALOFIBULAR LIGAMENT OF LEFT ANKLE, INITIAL ENCOUNTER: Primary | ICD-10-CM

## 2025-01-28 PROCEDURE — 99283 EMERGENCY DEPT VISIT LOW MDM: CPT

## 2025-01-28 PROCEDURE — 73610 X-RAY EXAM OF ANKLE: CPT

## 2025-01-28 PROCEDURE — 99283 EMERGENCY DEPT VISIT LOW MDM: CPT | Performed by: EMERGENCY MEDICINE

## 2025-01-28 NOTE — Clinical Note
[facility] EMERGENCY DEPARTMENT  [address]  [place] [address]  Phone: [phone]    Gela Valladares was seen and treated in our emergency department on 1/28/2025.  She may return to work on 01/31/2025.         Thank you for choosing [facility].    Toby Johnson MD

## 2025-01-29 RX ORDER — ORPHENADRINE CITRATE 100 MG/1
100 TABLET ORAL 2 TIMES DAILY PRN
Qty: 30 TABLET | Refills: 0 | Status: SHIPPED | OUTPATIENT
Start: 2025-01-29

## 2025-01-29 RX ORDER — IBUPROFEN 400 MG/1
800 TABLET, FILM COATED ORAL ONCE
Status: COMPLETED | OUTPATIENT
Start: 2025-01-29 | End: 2025-01-29

## 2025-01-29 RX ORDER — DICLOFENAC SODIUM 75 MG/1
75 TABLET, DELAYED RELEASE ORAL 2 TIMES DAILY
Qty: 60 TABLET | Refills: 0 | Status: SHIPPED | OUTPATIENT
Start: 2025-01-29 | End: 2025-02-28

## 2025-01-29 RX ADMIN — IBUPROFEN 800 MG: 400 TABLET, FILM COATED ORAL at 01:27

## 2025-01-30 NOTE — ED PROVIDER NOTES
Subjective   History of Present Illness  24 yo9 female presents with left ankle injury just prior to arrival. She says she was stepping off last step when she twirled her left foot while applying downward pressure and turning towards the left. She was able to bear weight but with pain. Did not take anything for relief.         Review of Systems   All other systems reviewed and are negative.      Past Medical History:   Diagnosis Date    ADHD     Anemia     Bronchitis 01/02/2024    Calculus of gallbladder with chronic cholecystitis with obstruction     History of heart murmur in childhood        No Known Allergies    Past Surgical History:   Procedure Laterality Date    CHOLECYSTECTOMY WITH INTRAOPERATIVE CHOLANGIOGRAM N/A 1/17/2024    Procedure: CHOLECYSTECTOMY LAPAROSCOPIC;  Surgeon: Juan Landis MD;  Location: [facility];  Service: General;  Laterality: N/A;    NO PAST SURGERIES         Family History   Problem Relation Age of Onset    Gallbladder disease Paternal Aunt     Gallbladder disease Paternal Grandmother     Gallbladder disease Cousin     Gallbladder disease Cousin     Malig Hyperthermia Neg Hx        Social History     Socioeconomic History    Marital status: Single   Tobacco Use    Smoking status: Never    Smokeless tobacco: Never   Vaping Use    Vaping status: Never Used   Substance and Sexual Activity    Alcohol use: Never    Drug use: Never    Sexual activity: Yes     Partners: Male           Objective   Physical Exam  Musculoskeletal:      Right ankle: Normal. Swelling present. No deformity. Tenderness present. Normal range of motion.      Comments: Swelling, mild tenderness, lateral malleolus of left ankle          Procedures           ED Course                                                       Medical Decision Making  23 yo female presents w left ankle injury  -able to bear weight  -no other injuries. No deformity om exam, and DP pulse as well as sensation present   -given nSaids  and walking boot    Problems Addressed:  Sprain of posterior talofibular ligament of left ankle, initial encounter: complicated acute illness or injury    Amount and/or Complexity of Data Reviewed  Radiology: ordered.     Details: XR Ankle 3+ View Left   Final Result    Impression:    No acute osseous abnormality.                    Electronically Signed: Garrett Fernandez MD      1/29/2025 12:46 AM EST      Workstation ID: [id number]         Risk  Prescription drug management.        Final diagnoses:   Sprain of posterior talofibular ligament of left ankle, initial encounter       ED Disposition  ED Disposition       ED Disposition   Discharge    Condition   Stable    Comment   --               Provider, No Known  [facility] [id number] [phone]               Medication List        New Prescriptions      diclofenac 75 MG EC tablet  Commonly known as: VOLTAREN  Take 1 tablet by mouth 2 (Two) Times a Day for 30 days.     orphenadrine 100 MG 12 hr tablet  Commonly known as: NORFLEX  Take 1 tablet by mouth 2 (Two) Times a Day As Needed for Muscle Spasms for up to 30 doses.               Where to Get Your Medications        These medications were sent to [facility] [id number] - [place] - [address] - [phone] [facility] [phone]   [address] [address]      Phone: [phone]   diclofenac 75 MG EC tablet  orphenadrine 100 MG 12 hr tablet            Toby Johnson MD  01/30/25 0597

## (undated) DEVICE — STPCK 3WY D201 DISCOFIX

## (undated) DEVICE — SUT MNCRYL 3/0 PS2 18IN MCP497G

## (undated) DEVICE — ENDOCUT SCISSOR TIP, DISPOSABLE: Brand: RENEW

## (undated) DEVICE — SYR LUERLOK 20CC BX/50

## (undated) DEVICE — DISPOSABLE MONOPOLAR ENDOSCOPIC CORD 10 FT. (3M): Brand: KIRWAN

## (undated) DEVICE — SUT VIC 0/0 UR6 27IN DYED J603H

## (undated) DEVICE — TROCAR: Brand: KII SLEEVE

## (undated) DEVICE — LAPAROSCOPIC SCOPE WARMER: Brand: DEROYAL

## (undated) DEVICE — PK LAP GEN 90

## (undated) DEVICE — SKIN PREP TRAY W/CHG: Brand: MEDLINE INDUSTRIES, INC.

## (undated) DEVICE — EXTENSION SET, MALE LUER LOCK ADAPTER WITH RETRACTABLE COLLAR

## (undated) DEVICE — 3M™ IOBAN™ 2 ANTIMICROBIAL INCISE DRAPE 6650EZ: Brand: IOBAN™ 2

## (undated) DEVICE — ENDOPATH PNEUMONEEDLE INSUFFLATION NEEDLES WITH LUER LOCK CONNECTORS 120MM: Brand: ENDOPATH

## (undated) DEVICE — ADHS SKIN SURG TISS VISC PREMIERPRO EXOFIN HI/VISC FAST/DRY

## (undated) DEVICE — TROCAR: Brand: KII FIOS FIRST ENTRY

## (undated) DEVICE — SOL NACL 0.9PCT 1000ML

## (undated) DEVICE — CATH CHOLANG 4.5F18IN BRGNDY

## (undated) DEVICE — SYR LUERLOK 30CC

## (undated) DEVICE — GLV SURG SENSICARE PI ORTHO PF SZ7 LF STRL

## (undated) DEVICE — 2, DISPOSABLE SUCTION/IRRIGATOR WITH DISPOSABLE TIP: Brand: STRYKEFLOW

## (undated) DEVICE — CATH IV INSYTE AUTOGARD 14G 1 1/2IN ORNG

## (undated) DEVICE — ENDOPOUCH RETRIEVER SPECIMEN RETRIEVAL BAGS: Brand: ENDOPOUCH RETRIEVER

## (undated) DEVICE — CONTAINER,SPECIMEN,OR STERILE,4OZ: Brand: MEDLINE